# Patient Record
Sex: FEMALE | Race: BLACK OR AFRICAN AMERICAN | NOT HISPANIC OR LATINO | Employment: FULL TIME | URBAN - METROPOLITAN AREA
[De-identification: names, ages, dates, MRNs, and addresses within clinical notes are randomized per-mention and may not be internally consistent; named-entity substitution may affect disease eponyms.]

---

## 2017-01-16 ENCOUNTER — TELEPHONE (OUTPATIENT)
Dept: OBSTETRICS AND GYNECOLOGY | Facility: CLINIC | Age: 27
End: 2017-01-16

## 2017-01-16 NOTE — TELEPHONE ENCOUNTER
----- Message from Flaca Stone sent at 1/16/2017  9:22 AM CST -----  Contact: pt  States she want's to get her depo and she's on her cycle now and she be off her cycle today or tomorrow. She needs to know what to do. Please call pt at [phone]. Thank you

## 2017-01-16 NOTE — TELEPHONE ENCOUNTER
Advised the pt to discuss Depo Provera with Dr. Dutton at her appt on Wednesday. Advised pt that she does not need to be on her cycle to get the depo shot and to collect a urine after she checks in for her appt. Pt voiced understanding.

## 2017-01-18 ENCOUNTER — OFFICE VISIT (OUTPATIENT)
Dept: OBSTETRICS AND GYNECOLOGY | Facility: CLINIC | Age: 27
End: 2017-01-18
Payer: COMMERCIAL

## 2017-01-18 VITALS
HEIGHT: 66 IN | DIASTOLIC BLOOD PRESSURE: 60 MMHG | WEIGHT: 115.06 LBS | SYSTOLIC BLOOD PRESSURE: 100 MMHG | BODY MASS INDEX: 18.49 KG/M2

## 2017-01-18 DIAGNOSIS — R10.2 PELVIC PAIN IN FEMALE: ICD-10-CM

## 2017-01-18 DIAGNOSIS — Z86.19 HISTORY OF CHLAMYDIA: ICD-10-CM

## 2017-01-18 DIAGNOSIS — Z30.013 ENCOUNTER FOR INITIAL PRESCRIPTION OF INJECTABLE CONTRACEPTIVE: ICD-10-CM

## 2017-01-18 DIAGNOSIS — N83.202 LEFT OVARIAN CYST: Primary | ICD-10-CM

## 2017-01-18 PROCEDURE — 99999 PR PBB SHADOW E&M-EST. PATIENT-LVL III: CPT | Mod: PBBFAC,,, | Performed by: OBSTETRICS & GYNECOLOGY

## 2017-01-18 PROCEDURE — 87591 N.GONORRHOEAE DNA AMP PROB: CPT

## 2017-01-18 PROCEDURE — 96372 THER/PROPH/DIAG INJ SC/IM: CPT | Mod: S$GLB,,, | Performed by: OBSTETRICS & GYNECOLOGY

## 2017-01-18 PROCEDURE — 99213 OFFICE O/P EST LOW 20 MIN: CPT | Mod: 25,S$GLB,, | Performed by: OBSTETRICS & GYNECOLOGY

## 2017-01-18 PROCEDURE — 1159F MED LIST DOCD IN RCRD: CPT | Mod: S$GLB,,, | Performed by: OBSTETRICS & GYNECOLOGY

## 2017-01-18 RX ORDER — MEDROXYPROGESTERONE ACETATE 150 MG/ML
150 INJECTION, SUSPENSION INTRAMUSCULAR
Status: DISCONTINUED | OUTPATIENT
Start: 2017-01-18 | End: 2018-02-15

## 2017-01-18 RX ADMIN — MEDROXYPROGESTERONE ACETATE 150 MG: 150 INJECTION, SUSPENSION INTRAMUSCULAR at 09:01

## 2017-01-18 NOTE — PROGRESS NOTES
"  Subjective:       Patient ID: Flavia Patricio is a 26 y.o. female.    Chief Complaint:  Follow-up      History of Present Illness  HPI  Presents to follow-up pelvic ultrasound.  Patient went to the ED for acute onset pelvic pain back in November.  CT Abd/pelvis showed a 5.5 cm left ovarian cyst.  She then presented to [facility] later that month for a well-woman exam.  Pelvic ultrasound was ordered, and showed a 3.5 cm left ovarian complex cyst, likely hemorrhagic cyst.  She then had a repeat ultrasound 6 weeks later that shows a 3.5cm left ovarian cyst consistent with possible endometrioma.  The patient reports some mild LLQ pain that has improved since November, but is still present.  She does, however, report "just not feeling right".  Has had epigastric pain that comes and goes and radiates into the RLQ.  She has been having nausea and fatigue, but no vomiting.  No documented fever.  No constipation or diarrhea.  Of note, she was treated for Chlamydia cervicitis in November, and her partner also completed therapy.  They have been using condoms, but one of the condoms broke during intercourse after they both completed therapy.  She wants a ELA today.  She also wants to start depo provera for contraception.    GYN & OB History  Patient's last menstrual period was 2017.   Date of Last Pap: 11/15/2016    OB History    Para Term  AB SAB TAB Ectopic Multiple Living   2 1 1  1 1    1      # Outcome Date GA Lbr Sung/2nd Weight Sex Delivery Anes PTL Lv   2 Term 06/10/15 40w0d  3.005 kg (6 lb 10 oz) M Vag-Spont   Y   1 SAB 12 5w0d                 Review of Systems  Review of Systems   Constitutional: Positive for fatigue. Negative for activity change, fever and unexpected weight change.   Gastrointestinal: Positive for abdominal pain (epigastric radiating mainly to the RLQ) and nausea. Negative for bloating, constipation, diarrhea and vomiting.   Genitourinary: Positive for pelvic pain (see " HPI). Negative for dyspareunia, dysuria, frequency, genital sores, hematuria, menorrhagia, menstrual problem, urgency, vaginal bleeding, vaginal discharge, vaginal pain, dysmenorrhea, postcoital bleeding and vaginal odor.   Skin:  Negative for rash and hair changes.           Objective:    Physical Exam:   Constitutional: She is oriented to person, place, and time. She appears well-developed and well-nourished. No distress.             Abdominal: Soft. She exhibits no distension and no mass. There is no tenderness. There is no rebound and no guarding. Hernia confirmed negative in the right inguinal area and confirmed negative in the left inguinal area.     Genitourinary: Vagina normal. Pelvic exam was performed with patient supine. There is no rash, tenderness, lesion or injury on the right labia. There is no rash, tenderness, lesion or injury on the left labia. Uterus is not deviated, not enlarged, not fixed, not tender and not experiencing uterine prolapse. Right adnexum displays no mass, no tenderness and no fullness. Left adnexum displays tenderness (mild). Left adnexum displays no mass and no fullness. No erythema, tenderness, bleeding, rectocele, cystocele or unspecified prolapse of vaginal walls in the vagina. No foreign body in the vagina. No signs of injury around the vagina. No vaginal discharge found. Cervix exhibits no motion tenderness, no discharge and no friability.        Uterus Size: 6 cm       Neurological: She is alert and oriented to person, place, and time.     Psychiatric: She has a normal mood and affect.        UPT: negative  Assessment:        1. Left ovarian cyst    2. History of chlamydia    3. Pelvic pain in female    4. Encounter for initial prescription of injectable contraceptive                Plan:      Flavia Carter was seen today for follow-up.    Diagnoses and all orders for this visit:    Left ovarian cyst  -     US Pelvis Complete Non OB; Future    History of chlamydia  -      C. trachomatis/N. gonorrhoeae by AMP DNA Cervix    Pelvic pain in female    Encounter for initial prescription of injectable contraceptive  -     medroxyPROGESTERone (DEPO-PROVERA) injection 150 mg; Inject 1 mL (150 mg total) into the muscle every 3 (three) months.    Patient has no evidence of PID on exam today.  No cervical d/c, CMT, or uterine tenderness.  I do not think her diffuse abdominal pain is related to a gyn source, and that needs further evaluation by her PCP.  The left ovarian cystic lesion seen is fairly small on ultrasound, and pt's LLQ pain seems to be improving.  Will start depo provera for contraception, but this will also help prevent further cysts from forming.  Will continue to monitor area on the left ovary.  I do not think she meets criteria just yet for surgery.  RTC pending results of repeat ultrasound.

## 2017-01-20 LAB
C TRACH DNA SPEC QL NAA+PROBE: NEGATIVE
N GONORRHOEA DNA SPEC QL NAA+PROBE: NEGATIVE

## 2017-02-14 ENCOUNTER — TELEPHONE (OUTPATIENT)
Dept: RADIOLOGY | Facility: HOSPITAL | Age: 27
End: 2017-02-14

## 2017-03-22 ENCOUNTER — TELEPHONE (OUTPATIENT)
Dept: RADIOLOGY | Facility: HOSPITAL | Age: 27
End: 2017-03-22

## 2017-03-23 ENCOUNTER — HOSPITAL ENCOUNTER (OUTPATIENT)
Dept: RADIOLOGY | Facility: HOSPITAL | Age: 27
Discharge: HOME | End: 2017-03-23
Attending: OBSTETRICS & GYNECOLOGY
Payer: COMMERCIAL

## 2017-03-23 DIAGNOSIS — N83.202 LEFT OVARIAN CYST: ICD-10-CM

## 2017-03-23 PROCEDURE — 76856 US EXAM PELVIC COMPLETE: CPT | Mod: 26,,, | Performed by: RADIOLOGY

## 2017-03-23 PROCEDURE — 76856 US EXAM PELVIC COMPLETE: CPT | Mod: TC,PO

## 2017-03-23 PROCEDURE — 76830 TRANSVAGINAL US NON-OB: CPT | Mod: 26,,, | Performed by: RADIOLOGY

## 2017-03-28 ENCOUNTER — NURSE TRIAGE (OUTPATIENT)
Dept: ADMINISTRATIVE | Facility: CLINIC | Age: 27
End: 2017-03-28

## 2017-03-28 NOTE — TELEPHONE ENCOUNTER
"  Reason for Disposition   [1] SEVERE pain (e.g., excruciating) AND [2] present > 1 hour    Answer Assessment - Initial Assessment Questions  1. LOCATION: "Where does it hurt?"       Just above waist on left side  2. RADIATION: "Does the pain shoot anywhere else?" (e.g., chest, back)      Goes across to the right side  3. ONSET: "When did the pain begin?" (e.g., minutes, hours or days ago)       Ongoing x 2 days  4. SUDDEN: "Gradual or sudden onset?"        5. PATTERN "Does the pain come and go, or is it constant?"     - If constant: "Is it getting better, staying the same, or worsening?"       (Note: Constant means the pain never goes away completely; most serious pain is constant and it progresses)      - If intermittent: "How long does it last?" "Do you have pain now?"      (Note: Intermittent means the pain goes away completely between bouts)      Constant -motrin relieved pain a little, didn't go away for long, and back now  6. SEVERITY: "How bad is the pain?"  (e.g., Scale 1-10; mild, moderate, or severe)     - MILD (1-3): doesn't interfere with normal activities, abdomen soft and not tender to touch      - MODERATE (4-7): interferes with normal activities or awakens from sleep, tender to touch      - SEVERE (8-10): excruciating pain, doubled over, unable to do any normal activities        8  7. RECURRENT SYMPTOM: "Have you ever had this type of abdominal pain before?" If so, ask: "When was the last time?" and "What happened that time?"         8. AGGRAVATING FACTORS: "Does anything seem to cause this pain?" (e.g., foods, stress, alcohol)      unsure  9. CARDIAC SYMPTOMS: "Do you have any of the following symptoms: chest pain, difficulty breathing, sweating, nausea?"      None reported  10. OTHER SYMPTOMS: "Do you have any other symptoms?" (e.g., fever, vomiting, diarrhea)        None reported  11. PREGNANCY: "Is there any chance you are pregnant?" "When was your last menstrual period?"         On depo " injection x 3 months    Protocols used: ST ABDOMINAL PAIN - [facility]-

## 2017-03-29 ENCOUNTER — TELEPHONE (OUTPATIENT)
Dept: OBSTETRICS AND GYNECOLOGY | Facility: CLINIC | Age: 27
End: 2017-03-29

## 2017-03-29 NOTE — TELEPHONE ENCOUNTER
----- Message from Naomi Maurer sent at 3/29/2017  9:22 AM CDT -----  Contact: Patient   Patient is having lower stomach pains, nausea and watery mouth, Please call her at [phone].    Thanks  Td

## 2017-04-06 ENCOUNTER — OFFICE VISIT (OUTPATIENT)
Dept: OBSTETRICS AND GYNECOLOGY | Facility: CLINIC | Age: 27
End: 2017-04-06
Payer: COMMERCIAL

## 2017-04-06 VITALS
DIASTOLIC BLOOD PRESSURE: 78 MMHG | SYSTOLIC BLOOD PRESSURE: 120 MMHG | BODY MASS INDEX: 18.85 KG/M2 | WEIGHT: 117.31 LBS | HEIGHT: 66 IN

## 2017-04-06 DIAGNOSIS — N89.8 VAGINAL DISCHARGE: ICD-10-CM

## 2017-04-06 DIAGNOSIS — N83.202 LEFT OVARIAN CYST: Primary | ICD-10-CM

## 2017-04-06 DIAGNOSIS — R10.2 PELVIC PAIN IN FEMALE: ICD-10-CM

## 2017-04-06 PROCEDURE — 99213 OFFICE O/P EST LOW 20 MIN: CPT | Mod: 25,S$GLB,, | Performed by: OBSTETRICS & GYNECOLOGY

## 2017-04-06 PROCEDURE — 87210 SMEAR WET MOUNT SALINE/INK: CPT | Mod: QW,S$GLB,, | Performed by: OBSTETRICS & GYNECOLOGY

## 2017-04-06 PROCEDURE — 99999 PR PBB SHADOW E&M-EST. PATIENT-LVL III: CPT | Mod: PBBFAC,,, | Performed by: OBSTETRICS & GYNECOLOGY

## 2017-04-06 PROCEDURE — 1160F RVW MEDS BY RX/DR IN RCRD: CPT | Mod: S$GLB,,, | Performed by: OBSTETRICS & GYNECOLOGY

## 2017-04-06 RX ORDER — NORGESTIMATE AND ETHINYL ESTRADIOL 7DAYSX3 28
KIT ORAL
COMMUNITY
Start: 2016-08-13 | End: 2017-04-06

## 2017-04-06 NOTE — PROGRESS NOTES
Subjective:       Patient ID: Flavia Patricio is a 26 y.o. female.    Chief Complaint:  Abdominal Pain      History of Present Illness  HPI  Presents to follow-up pelvic pain and repeat ultrasound findings.  To recap, the patient has had worsening LLQ pelvic pain for several months.  Imaging back in November was significant for a complex cyst on the left ovary measuring about 3cm that was consistent with either a hemorrhagic cyst or endometrioma.  Patient wanted to try depo provera for contraception.  She has achieved amenorrhea on depo provera, but continues to have significant LLQ pelvic pain that radiates to the midline.  Pain is daily.  Repeat pelvic ultrasound 3/23/17 shows a stable appearing 2.9cm complex cyst on the left ovary consistent with an endometrioma.  Pt also complains of a clear/white vaginal discharge.  No itching or odor.  No new sex partner.  GC/CT at last visit was neg/neg.    GYN & OB History  No LMP recorded. Patient has had an injection.   Date of Last Pap: 11/15/2016    OB History    Para Term  AB SAB TAB Ectopic Multiple Living   2 1 1  1 1    1      # Outcome Date GA Lbr Sung/2nd Weight Sex Delivery Anes PTL Lv   2 Term 06/10/15 40w0d  3.005 kg (6 lb 10 oz) M Vag-Spont   Y   1 SAB 12 5w0d                 Review of Systems  Review of Systems   Constitutional: Negative for activity change, fatigue, fever and unexpected weight change.   Gastrointestinal: Negative for abdominal pain, bloating, constipation, diarrhea, nausea and vomiting.   Genitourinary: Positive for pelvic pain and vaginal discharge. Negative for dyspareunia, dysuria, frequency, genital sores, hematuria, menorrhagia, menstrual problem, urgency, vaginal bleeding, vaginal pain, dysmenorrhea, postcoital bleeding and vaginal odor.   Skin:  Negative for rash and hair changes.           Objective:    Physical Exam:   Constitutional: She is oriented to person, place, and time. She appears well-developed  and well-nourished. No distress.             Abdominal: Soft. She exhibits no distension and no mass. There is no tenderness. There is no rebound and no guarding. Hernia confirmed negative in the right inguinal area and confirmed negative in the left inguinal area.     Genitourinary: Vagina normal. Pelvic exam was performed with patient supine. There is no rash, tenderness, lesion or injury on the right labia. There is no rash, tenderness, lesion or injury on the left labia. Uterus is not deviated, not enlarged, not fixed, not tender and not experiencing uterine prolapse. Right adnexum displays no mass, no tenderness and no fullness. Left adnexum displays tenderness. Left adnexum displays no mass and no fullness. No erythema, tenderness, bleeding, rectocele, cystocele or unspecified prolapse of vaginal walls in the vagina. No foreign body in the vagina. No signs of injury around the vagina. No vaginal discharge found. Cervix exhibits no motion tenderness, no discharge and no friability.        Uterus Size: 6 cm       Neurological: She is alert and oriented to person, place, and time.     Psychiatric: She has a normal mood and affect.        Wet prep: normal vaginal squamous epithelial cells    Assessment:        1. Left ovarian cyst    2. Pelvic pain in female    3. Vaginal discharge                Plan:      Flavia Carter was seen today for abdominal pain.    Diagnoses and all orders for this visit:    Left ovarian cyst    Pelvic pain in female    Vaginal discharge    At this point, since patient still having pelvic pain on depo provera and since complex cyst is persistent and consistent with endometrioma, I recommend surgery with laparoscopic left ovarian cystectomy.  Patient agrees with this plan.    Reassured her that her discharge is physiologic.  No treatment necessary.  RTC for pre-op visit.

## 2017-04-06 NOTE — PATIENT INSTRUCTIONS
Understanding Ovarian Cystectomy    An ovarian cystectomy is a type of surgery. It removes a cyst from your ovaries. A cyst is a sac full of fluid. You have two ovaries, one on each side of your uterus. The ovaries make your eggs (ova). They also make the hormone estrogen.  How to say it  [id number]   Why ovarian cystectomy is done  This procedure is done to remove a cyst on an ovary. Its usually done only if the cyst is large or painful. Many women have cysts on their ovaries. They are often benign. But they can be cancerous.  How ovarian cystectomy is done  This procedure is often done in a hospital. You may need to spend a few days in the hospital after the cyst is removed. During the procedure:  · You are given medicine to make you fall asleep. You wont feel any pain.  · The surgeon makes a cut (incision) in your belly (abdomen) to reach your reproductive organs.  · The surgeon puts a clamp on the ovary to hold it still.  · The surgeon carefully cuts into the ovary, revealing the cyst.  · The surgeon may drain the cyst. It is then cut away from the ovary.  · The cyst may be sent to a lab to check for disease, such as cancer.  · The surgeon stops any bleeding from the ovary. He or she then closes the cut in your belly.  Risks of ovarian cystectomy  · Bleeding  · Damage to an ovary  · Infection  · Injury to the bladder  Date Last Reviewed: 6/1/2016  © 5610-9549 [employer]. [address] [place] [address], [place] [address]. All rights reserved. This information is not intended as a substitute for professional medical care. Always follow your healthcare professional's instructions.

## 2017-04-07 ENCOUNTER — TELEPHONE (OUTPATIENT)
Dept: OBSTETRICS AND GYNECOLOGY | Facility: CLINIC | Age: 27
End: 2017-04-07

## 2017-04-07 DIAGNOSIS — N83.202 CYST OF LEFT OVARY: Primary | ICD-10-CM

## 2017-04-19 ENCOUNTER — TELEPHONE (OUTPATIENT)
Dept: OBSTETRICS AND GYNECOLOGY | Facility: CLINIC | Age: 27
End: 2017-04-19

## 2017-04-19 ENCOUNTER — CLINICAL SUPPORT (OUTPATIENT)
Dept: OBSTETRICS AND GYNECOLOGY | Facility: CLINIC | Age: 27
End: 2017-04-19
Payer: COMMERCIAL

## 2017-04-19 PROCEDURE — 96372 THER/PROPH/DIAG INJ SC/IM: CPT | Mod: S$GLB,,, | Performed by: OBSTETRICS & GYNECOLOGY

## 2017-04-19 RX ADMIN — MEDROXYPROGESTERONE ACETATE 150 MG: 150 INJECTION, SUSPENSION INTRAMUSCULAR at 01:04

## 2017-04-19 NOTE — PROGRESS NOTES
Two pt identifiers verified  pt notified to wait in clinic for 15 minutes after receiving injection, pt verbalized understanding  150mg/mL of Depo Provera administered IM to pt's right ventrogluteal.   Pt tolerated well  Next injection scheduled for 07/12/2017 at 1000

## 2017-04-24 ENCOUNTER — OFFICE VISIT (OUTPATIENT)
Dept: OBSTETRICS AND GYNECOLOGY | Facility: CLINIC | Age: 27
End: 2017-04-24
Payer: COMMERCIAL

## 2017-04-24 ENCOUNTER — HOSPITAL ENCOUNTER (OUTPATIENT)
Dept: PREADMISSION TESTING | Facility: HOSPITAL | Age: 27
Discharge: HOME | End: 2017-04-24
Attending: OBSTETRICS & GYNECOLOGY
Payer: COMMERCIAL

## 2017-04-24 VITALS
BODY MASS INDEX: 18.7 KG/M2 | HEIGHT: 66 IN | SYSTOLIC BLOOD PRESSURE: 98 MMHG | WEIGHT: 116.38 LBS | DIASTOLIC BLOOD PRESSURE: 66 MMHG

## 2017-04-24 VITALS — HEIGHT: 66 IN | BODY MASS INDEX: 18.32 KG/M2 | WEIGHT: 114 LBS

## 2017-04-24 DIAGNOSIS — R10.2 PELVIC PAIN IN FEMALE: ICD-10-CM

## 2017-04-24 DIAGNOSIS — N83.202 LEFT OVARIAN CYST: Primary | ICD-10-CM

## 2017-04-24 DIAGNOSIS — D50.8 IRON DEFICIENCY ANEMIA SECONDARY TO INADEQUATE DIETARY IRON INTAKE: ICD-10-CM

## 2017-04-24 PROCEDURE — 99499 UNLISTED E&M SERVICE: CPT | Mod: S$GLB,,, | Performed by: OBSTETRICS & GYNECOLOGY

## 2017-04-24 PROCEDURE — 99999 PR PBB SHADOW E&M-EST. PATIENT-LVL II: CPT | Mod: PBBFAC,,, | Performed by: OBSTETRICS & GYNECOLOGY

## 2017-04-24 RX ORDER — SODIUM CHLORIDE, SODIUM LACTATE, POTASSIUM CHLORIDE, CALCIUM CHLORIDE 600; 310; 30; 20 MG/100ML; MG/100ML; MG/100ML; MG/100ML
INJECTION, SOLUTION INTRAVENOUS CONTINUOUS
Status: CANCELLED | OUTPATIENT
Start: 2017-04-24

## 2017-04-24 RX ORDER — ACETAMINOPHEN 10 MG/ML
1000 INJECTION, SOLUTION INTRAVENOUS
Status: CANCELLED | OUTPATIENT
Start: 2017-04-24 | End: 2017-04-24

## 2017-04-24 NOTE — IP AVS SNAPSHOT
[facility]  [id number] [place] [address] [address] [place] [address]           Patient Discharge Instructions  Our goal is to set you up for success. This packet includes information on your condition, medications, and your home care. It will help you care for yourself to prevent having to return to the hospital.     Please ask your nurse if you have any questions.      There are many details to remember when preparing for your surgery. Here is what you will need to do, please ask your nurse if there are more specific instructions and if you have any questions:    1. Before procedure Do not smoke or drink alcoholic beverages 24 hours prior to your procedure. Do not eat or drink anything 8 hours before your procedure - this includes gum, mints, and candy.     2. Day of procedure Please remove all jewelry for the procedure. If you wear contact lenses, dentures, hearing aids or glasses, bring a container to put them in during your surgery and give to a family member.  If your doctor has scheduled you for an overnight stay, bring a small overnight bag with any personal items that you need.      3. After procedure  Make arrangements in advance for transportation home by a responsible adult. It is not safe to drive a vehicle during the 24 hours following surgery.     PLEASE NOTE: You may be contacted the day before your surgery to confirm your surgery date and arrival time. The Surgery schedule has many variables which may affect the time of your surgery case. Family members should be available if your surgery time changes.           [facility]  Unless otherwise directed by your provider, please   contact [facility], our nurse care line   that is available for 24/7 assistance.     [phone] (toll-free)     Registered nurses in the [facility]   provide: appointment scheduling, clinical advisement, health education, and other advisory services.                  ** Verify the list of  medication(s) below is accurate and up to date. Carry this with you in case of emergency. If your medications have changed, please notify your healthcare provider.             Medication List      TAKE these medications        Additional Info                      ferrous gluconate 325 MG Tab   Commonly known as:  FERGON   Quantity:  30 tablet   Refills:  3   Dose:  325 mg    Instructions:  Take 1 tablet (325 mg total) by mouth daily with breakfast.     Begin Date    AM    Noon    PM    Bedtime                  Please bring to all follow up appointments:    1. A copy of your discharge instructions.  2. All medicines you are currently taking in their original bottles.  3. Identification and insurance card.    Please arrive 15 minutes ahead of scheduled appointment time.    Please call 24 hours in advance if you must reschedule your appointment and/or time.        Your Scheduled Appointments     Apr 24, 2017 10:30 AM CDT   Non-Fasting Lab with LABORATORY, CONI AHMADI   [facility] ([facility] [address]   [phone]            May 22, 2017  8:30 AM CDT   Post OP with MD Coni Vasquez - OB/ GYN ([facility]) [address] [place] [address]   [phone]            Jul 12, 2017 10:00 AM CDT   Nurse Visit with OB GYN NURSEELO - OB/ GYN ([facility]) [address] [place] [address]   [phone]              Your Future Surgeries/Procedures     May 01, 2017   Surgery with Yohana Dutton MD   [facility] [place] [facility])    [address] [place] [address]   [phone]                  Discharge Instructions       To confirm, Your doctor has instructed you that surgery is scheduled for 5/1/17 at  09:30am.       [facility], main lobby by 08:00am Pre admit nurse will  "call day prior to surgery for final arrival time.      INSTRUCTIONS IMPORTANT!!!   Do not eat, drink, or smoke after 12 midnight. May have water or clear liquid juice until 3 hrs prior to surgery. OK to brush teeth, no gum, candy or mints!    ¨ Take only these medicines with a small swallow of water-morning of surgery.  None    Pre operative instructions:  Please review the Pre-Operative Instruction booklet that you were given.        Bathing Instructions--See page 6 in the Pre-operative booklet.      Prevention of surgical site infections:     -Keep incisions clean and dry.   -Do not soak/submerge incisions in water until completely healed.   -Do not apply lotions, powders, creams, or deodorants to site.   -Always make sure hands are cleaned with antibacterial soap/ alcohol-based                 prior to touching the surgical site.  (This includes doctors,                 nurses, staff, and yourself.)    Signs and symptoms:   -Redness and pain around the area where you had surgery   -Drainage of cloudy fluid from your surgical wound   -Fever over 100.4       I have read or had read and explained to me, and understand the above information.  Additional comments or instructions:  Received a copy of Pre-operative instructions booklet, FAQ surgical site infection sheet, and packets of hibiclens (if indicated).      Discharge References/Attachments     CYSTECTOMY, OVARIAN, UNDERSTANDING (ENGLISH)    ANESTHESIA: GENERAL ANESTHESIA (ENGLISH)    POSTSURGICAL DEEP BREATHING, DISCHARGE INSTRUCTIONS (ENGLISH)        Admission Information     Date & Time Provider Department CSN    4/24/2017  9:00 AM Yohana Dutton MD [facility] -  [id number]      Care Providers     Provider Role Specialty Primary office phone    Yohana Dutton MD Attending Provider Obstetrics and Gynecology [phone]      Your Vitals Were     Height Weight BMI          5' 6" (1.676 m) 51.7 kg (114 lb) 18.4 kg/m2        Recent Lab " Values     No lab values to display.      Allergies as of 4/24/2017     No Known Allergies      Advance Directives     An advance directive is a document which, in the event you are no longer able to make decisions for yourself, tells your healthcare team what kind of treatment you do or do not want to receive, or who you would like to make those decisions for you.  If you do not currently have an advance directive, [facility] encourages you to create one.  For more information call:  [phone]), [phone] ([phone]),  or log on to [email or web address].        Smoking Cessation     If you would like to quit smoking:   You may be eligible for free services if you are a [place] resident and started smoking cigarettes before September 1, 1988.  Call the Smoking Cessation Trust (SCT) toll free at (498) 705-5074 or (512) 783-9263.   Call 4-487-QUIT-NOW if you do not meet the above criteria.   Contact us via email: [email or web address]   View our website for more information: [email or web address]        Language Assistance Services     ATTENTION: Language assistance services are available, free of charge. Please call [phone].      ATENCIÓN: Si habla español, tiene a larry disposición servicios gratuitos de asistencia lingüística. Llame al [phone].     [facility] Ý: N?u b?n nói Ti?ng Vi?t, có các d?ch v? h? tr? ngôn ng? mi?n phí dành cho b?n. G?i s? [phone].         [facility] complies with applicable Federal civil rights laws and does not discriminate on the basis of race, color, national origin, age, disability, or sex.

## 2017-04-24 NOTE — PROGRESS NOTES
"History & Physical    SUBJECTIVE:     History of Present Illness:  Patient is a 26 y.o. female presents for pre-op visit for laparoscopic left ovarian cystectomy.  the patient has had worsening LLQ pelvic pain for several months. Imaging back in November was significant for a complex cyst on the left ovary measuring about 3cm that was consistent with either a hemorrhagic cyst or endometrioma. Patient wanted to try depo provera for contraception. She has achieved amenorrhea on depo provera, but continues to have significant LLQ pelvic pain that radiates to the midline. Pain is daily. Repeat pelvic ultrasound 3/23/17 shows a stable appearing 2.9cm complex cyst on the left ovary consistent with an endometrioma.      Requests refill of iron today.    Past Medical History:   Diagnosis Date    Anemia     Genital herpes complicating pregnancy in second trimester 2/16/2015    GERD (gastroesophageal reflux disease)     Insomnia     Miscarriage     Pelvic inflammatory disease (PID)     Restless leg syndrome      Past Surgical History:   Procedure Laterality Date    COLONOSCOPY W/ POLYPECTOMY      x 1    WISDOM TOOTH EXTRACTION       Social History     Social History    Marital status: Single     Spouse name: N/A    Number of children: N/A    Years of education: N/A     Occupational History    manager       Social History Main Topics    Smoking status: Light Tobacco Smoker    Smokeless tobacco: Not on file      Comment: pt states "i smoke marijuana twice a day"    Alcohol use Yes      Comment: rare    Drug use: 14.00 per week     Special: Marijuana    Sexual activity: Yes     Partners: Male     Birth control/ protection: None     Other Topics Concern    Not on file     Social History Narrative     Family History   Problem Relation Age of Onset    Colon cancer Maternal Grandfather     Breast cancer Cousin     Heart attack Maternal Uncle     Heart attack Maternal Uncle     Heart attack Maternal Uncle     " Heart attack Maternal Uncle     Heart attack Maternal Uncle      Review of patient's allergies indicates:  No Known Allergies  Current Outpatient Prescriptions   Medication Sig Dispense Refill    ferrous gluconate (FERGON) 325 MG Tab Take 1 tablet (325 mg total) by mouth daily with breakfast. 30 tablet 3     Current Facility-Administered Medications   Medication Dose Route Frequency Provider Last Rate Last Dose    medroxyPROGESTERone (DEPO-PROVERA) injection 150 mg  150 mg Intramuscular Q90 Days Yohana Dutton MD   150 mg at 04/19/17 1332            Review of Systems:  Constitutional: no fever or chills  Respiratory: no cough or shortness of breath  Cardiovascular: no chest pain or palpitations  Gastrointestinal: no nausea or vomiting, tolerating diet  Genitourinary: see HPI  Hematologic/Lymphatic: no easy bruising or lymphadenopathy  Neurological: no seizures or tremors      OBJECTIVE:     Vitals:    04/24/17 0813   BP: 98/66         Physical Exam:  General: well developed, well nourished, no distress  Head: normocephalic  Neck: supple, symmetrical, trachea midline  Lungs:  clear to auscultation bilaterally and normal respiratory effort  Chest Wall: no tenderness  Heart: regular rate and rhythm, S1, S2 normal, no murmur, rub or gallop  Abdomen: soft, non-tender non-distented; bowel sounds normal; no masses,  no organomegaly  Extremities: no cyanosis or edema, or clubbing  Pulses: 2+ and symmetric      Laboratory  Pre-op labs pending      ASSESSMENT/PLAN:     Flavia Carter was seen today for pre-op exam.    Diagnoses and all orders for this visit:    Left ovarian cyst    Pelvic pain in female    Iron deficiency anemia secondary to inadequate dietary iron intake  -     ferrous gluconate (FERGON) 325 MG Tab; Take 1 tablet (325 mg total) by mouth daily with breakfast.     The risks, benefits, and alternatives of the procedure were reviewed with the patient.  Surgical consents were reviewed in detail and were  signed.  All questions were answered. Proceed with laparoscopic left ovarian cystectomy as scheduled.

## 2017-04-24 NOTE — DISCHARGE INSTRUCTIONS
To confirm, Your doctor has instructed you that surgery is scheduled for 5/1/17 at  09:30am.       Please report to [facility], [address] [facility], 1st floor, main lobby by 08:00am Pre admit nurse will call day prior to surgery for final arrival time.      INSTRUCTIONS IMPORTANT!!!   Do not eat, drink, or smoke after 12 midnight. May have water or clear liquid juice until 3 hrs prior to surgery. OK to brush teeth, no gum, candy or mints!    ¨ Take only these medicines with a small swallow of water-morning of surgery.  None    Pre operative instructions:  Please review the Pre-Operative Instruction booklet that you were given.        Bathing Instructions--See page 6 in the Pre-operative booklet.      Prevention of surgical site infections:     -Keep incisions clean and dry.   -Do not soak/submerge incisions in water until completely healed.   -Do not apply lotions, powders, creams, or deodorants to site.   -Always make sure hands are cleaned with antibacterial soap/ alcohol-based                 prior to touching the surgical site.  (This includes doctors,                 nurses, staff, and yourself.)    Signs and symptoms:   -Redness and pain around the area where you had surgery   -Drainage of cloudy fluid from your surgical wound   -Fever over 100.4       I have read or had read and explained to me, and understand the above information.  Additional comments or instructions:  Received a copy of Pre-operative instructions booklet, FAQ surgical site infection sheet, and packets of hibiclens (if indicated).

## 2017-04-30 ENCOUNTER — ANESTHESIA EVENT (OUTPATIENT)
Dept: SURGERY | Facility: HOSPITAL | Age: 27
End: 2017-04-30
Payer: COMMERCIAL

## 2017-05-01 ENCOUNTER — HOSPITAL ENCOUNTER (OUTPATIENT)
Facility: HOSPITAL | Age: 27
Discharge: HOME | End: 2017-05-01
Attending: OBSTETRICS & GYNECOLOGY | Admitting: OBSTETRICS & GYNECOLOGY
Payer: COMMERCIAL

## 2017-05-01 ENCOUNTER — ANESTHESIA (OUTPATIENT)
Dept: SURGERY | Facility: HOSPITAL | Age: 27
End: 2017-05-01
Payer: COMMERCIAL

## 2017-05-01 VITALS
DIASTOLIC BLOOD PRESSURE: 93 MMHG | TEMPERATURE: 98 F | HEART RATE: 88 BPM | SYSTOLIC BLOOD PRESSURE: 148 MMHG | RESPIRATION RATE: 12 BRPM | OXYGEN SATURATION: 100 % | BODY MASS INDEX: 18.21 KG/M2 | HEIGHT: 66 IN | WEIGHT: 113.31 LBS

## 2017-05-01 DIAGNOSIS — N80.30 ENDOMETRIOSIS OF PELVIC PERITONEUM: ICD-10-CM

## 2017-05-01 DIAGNOSIS — N83.202 LEFT OVARIAN CYST: Primary | ICD-10-CM

## 2017-05-01 DIAGNOSIS — R10.2 PELVIC PAIN IN FEMALE: ICD-10-CM

## 2017-05-01 LAB
B-HCG UR QL: NEGATIVE
CTP QC/QA: YES

## 2017-05-01 PROCEDURE — 37000009 HC ANESTHESIA EA ADD 15 MINS: Performed by: OBSTETRICS & GYNECOLOGY

## 2017-05-01 PROCEDURE — 37000008 HC ANESTHESIA 1ST 15 MINUTES: Performed by: OBSTETRICS & GYNECOLOGY

## 2017-05-01 PROCEDURE — 25000003 PHARM REV CODE 250: Performed by: ANESTHESIOLOGY

## 2017-05-01 PROCEDURE — 63600175 PHARM REV CODE 636 W HCPCS: Performed by: ANESTHESIOLOGY

## 2017-05-01 PROCEDURE — 63600175 PHARM REV CODE 636 W HCPCS: Performed by: NURSE ANESTHETIST, CERTIFIED REGISTERED

## 2017-05-01 PROCEDURE — 36000709 HC OR TIME LEV III EA ADD 15 MIN: Performed by: OBSTETRICS & GYNECOLOGY

## 2017-05-01 PROCEDURE — 88305 TISSUE EXAM BY PATHOLOGIST: CPT | Mod: 26,,, | Performed by: PATHOLOGY

## 2017-05-01 PROCEDURE — 36000708 HC OR TIME LEV III 1ST 15 MIN: Performed by: OBSTETRICS & GYNECOLOGY

## 2017-05-01 PROCEDURE — 25000003 PHARM REV CODE 250: Performed by: NURSE ANESTHETIST, CERTIFIED REGISTERED

## 2017-05-01 PROCEDURE — 81025 URINE PREGNANCY TEST: CPT | Performed by: OBSTETRICS & GYNECOLOGY

## 2017-05-01 PROCEDURE — 58662 LAPAROSCOPY EXCISE LESIONS: CPT | Mod: ,,, | Performed by: OBSTETRICS & GYNECOLOGY

## 2017-05-01 PROCEDURE — 71000039 HC RECOVERY, EACH ADD'L HOUR: Performed by: OBSTETRICS & GYNECOLOGY

## 2017-05-01 PROCEDURE — 71000033 HC RECOVERY, INTIAL HOUR: Performed by: OBSTETRICS & GYNECOLOGY

## 2017-05-01 PROCEDURE — 63600175 PHARM REV CODE 636 W HCPCS: Performed by: OBSTETRICS & GYNECOLOGY

## 2017-05-01 PROCEDURE — 88305 TISSUE EXAM BY PATHOLOGIST: CPT | Performed by: PATHOLOGY

## 2017-05-01 PROCEDURE — 27201423 OPTIME MED/SURG SUP & DEVICES STERILE SUPPLY: Performed by: OBSTETRICS & GYNECOLOGY

## 2017-05-01 PROCEDURE — 71000015 HC POSTOP RECOV 1ST HR: Performed by: OBSTETRICS & GYNECOLOGY

## 2017-05-01 RX ORDER — KETOROLAC TROMETHAMINE 30 MG/ML
30 INJECTION, SOLUTION INTRAMUSCULAR; INTRAVENOUS ONCE
Status: COMPLETED | OUTPATIENT
Start: 2017-05-01 | End: 2017-05-01

## 2017-05-01 RX ORDER — SODIUM CHLORIDE 9 MG/ML
3 INJECTION, SOLUTION INTRAMUSCULAR; INTRAVENOUS; SUBCUTANEOUS
Status: DISCONTINUED | OUTPATIENT
Start: 2017-05-01 | End: 2017-05-01 | Stop reason: HOSPADM

## 2017-05-01 RX ORDER — ROCURONIUM BROMIDE 10 MG/ML
INJECTION, SOLUTION INTRAVENOUS
Status: DISCONTINUED | OUTPATIENT
Start: 2017-05-01 | End: 2017-05-01

## 2017-05-01 RX ORDER — SODIUM CHLORIDE, SODIUM LACTATE, POTASSIUM CHLORIDE, CALCIUM CHLORIDE 600; 310; 30; 20 MG/100ML; MG/100ML; MG/100ML; MG/100ML
INJECTION, SOLUTION INTRAVENOUS CONTINUOUS
Status: DISCONTINUED | OUTPATIENT
Start: 2017-05-01 | End: 2017-05-01 | Stop reason: HOSPADM

## 2017-05-01 RX ORDER — SODIUM CHLORIDE 9 MG/ML
3 INJECTION, SOLUTION INTRAMUSCULAR; INTRAVENOUS; SUBCUTANEOUS EVERY 8 HOURS
Status: DISCONTINUED | OUTPATIENT
Start: 2017-05-01 | End: 2017-05-01 | Stop reason: HOSPADM

## 2017-05-01 RX ORDER — OXYCODONE AND ACETAMINOPHEN 5; 325 MG/1; MG/1
1 TABLET ORAL EVERY 4 HOURS PRN
Qty: 30 TABLET | Refills: 0 | Status: SHIPPED | OUTPATIENT
Start: 2017-05-01 | End: 2018-02-15

## 2017-05-01 RX ORDER — PROPOFOL 10 MG/ML
VIAL (ML) INTRAVENOUS
Status: DISCONTINUED | OUTPATIENT
Start: 2017-05-01 | End: 2017-05-01

## 2017-05-01 RX ORDER — LIDOCAINE HYDROCHLORIDE 10 MG/ML
INJECTION INFILTRATION; PERINEURAL
Status: DISCONTINUED | OUTPATIENT
Start: 2017-05-01 | End: 2017-05-01

## 2017-05-01 RX ORDER — FENTANYL CITRATE 50 UG/ML
INJECTION, SOLUTION INTRAMUSCULAR; INTRAVENOUS
Status: DISCONTINUED | OUTPATIENT
Start: 2017-05-01 | End: 2017-05-01

## 2017-05-01 RX ORDER — OXYCODONE HYDROCHLORIDE 5 MG/1
5 TABLET ORAL ONCE
Status: COMPLETED | OUTPATIENT
Start: 2017-05-01 | End: 2017-05-01

## 2017-05-01 RX ORDER — MEPERIDINE HYDROCHLORIDE 50 MG/ML
12.5 INJECTION INTRAMUSCULAR; INTRAVENOUS; SUBCUTANEOUS ONCE AS NEEDED
Status: COMPLETED | OUTPATIENT
Start: 2017-05-01 | End: 2017-05-01

## 2017-05-01 RX ORDER — LIDOCAINE HYDROCHLORIDE 10 MG/ML
1 INJECTION, SOLUTION EPIDURAL; INFILTRATION; INTRACAUDAL; PERINEURAL ONCE
Status: DISCONTINUED | OUTPATIENT
Start: 2017-05-01 | End: 2017-05-01 | Stop reason: HOSPADM

## 2017-05-01 RX ORDER — MIDAZOLAM HYDROCHLORIDE 1 MG/ML
INJECTION, SOLUTION INTRAMUSCULAR; INTRAVENOUS
Status: DISCONTINUED | OUTPATIENT
Start: 2017-05-01 | End: 2017-05-01

## 2017-05-01 RX ORDER — MORPHINE SULFATE 10 MG/ML
2 INJECTION INTRAMUSCULAR; INTRAVENOUS; SUBCUTANEOUS EVERY 5 MIN PRN
Status: DISCONTINUED | OUTPATIENT
Start: 2017-05-01 | End: 2017-05-01 | Stop reason: HOSPADM

## 2017-05-01 RX ORDER — PROMETHAZINE HYDROCHLORIDE 12.5 MG/1
12.5 TABLET ORAL EVERY 6 HOURS PRN
Qty: 30 TABLET | Refills: 0 | Status: SHIPPED | OUTPATIENT
Start: 2017-05-01 | End: 2018-02-15

## 2017-05-01 RX ORDER — ACETAMINOPHEN 10 MG/ML
1000 INJECTION, SOLUTION INTRAVENOUS
Status: COMPLETED | OUTPATIENT
Start: 2017-05-01 | End: 2017-05-01

## 2017-05-01 RX ORDER — ONDANSETRON 2 MG/ML
INJECTION INTRAMUSCULAR; INTRAVENOUS
Status: DISCONTINUED | OUTPATIENT
Start: 2017-05-01 | End: 2017-05-01

## 2017-05-01 RX ADMIN — FENTANYL CITRATE 100 MCG: 50 INJECTION, SOLUTION INTRAMUSCULAR; INTRAVENOUS at 09:05

## 2017-05-01 RX ADMIN — ACETAMINOPHEN 1000 MG: 10 INJECTION, SOLUTION INTRAVENOUS at 10:05

## 2017-05-01 RX ADMIN — PROPOFOL 50 MG: 10 INJECTION, EMULSION INTRAVENOUS at 10:05

## 2017-05-01 RX ADMIN — SODIUM CHLORIDE, SODIUM LACTATE, POTASSIUM CHLORIDE, AND CALCIUM CHLORIDE: 600; 310; 30; 20 INJECTION, SOLUTION INTRAVENOUS at 09:05

## 2017-05-01 RX ADMIN — PROPOFOL 150 MG: 10 INJECTION, EMULSION INTRAVENOUS at 10:05

## 2017-05-01 RX ADMIN — KETOROLAC TROMETHAMINE 30 MG: 30 INJECTION, SOLUTION INTRAMUSCULAR at 12:05

## 2017-05-01 RX ADMIN — MORPHINE SULFATE 2 MG: 10 INJECTION, SOLUTION INTRAMUSCULAR; INTRAVENOUS at 11:05

## 2017-05-01 RX ADMIN — MORPHINE SULFATE 2 MG: 10 INJECTION, SOLUTION INTRAMUSCULAR; INTRAVENOUS at 12:05

## 2017-05-01 RX ADMIN — ROCURONIUM BROMIDE 25 MG: 10 INJECTION, SOLUTION INTRAVENOUS at 10:05

## 2017-05-01 RX ADMIN — MIDAZOLAM HYDROCHLORIDE 2 MG: 1 INJECTION, SOLUTION INTRAMUSCULAR; INTRAVENOUS at 09:05

## 2017-05-01 RX ADMIN — LIDOCAINE HYDROCHLORIDE 80 MG: 10 INJECTION, SOLUTION INFILTRATION; PERINEURAL at 10:05

## 2017-05-01 RX ADMIN — MEPERIDINE HYDROCHLORIDE 12.5 MG: 50 INJECTION INTRAMUSCULAR; INTRAVENOUS; SUBCUTANEOUS at 11:05

## 2017-05-01 RX ADMIN — FENTANYL CITRATE 100 MCG: 50 INJECTION, SOLUTION INTRAMUSCULAR; INTRAVENOUS at 10:05

## 2017-05-01 RX ADMIN — ONDANSETRON 4 MG: 2 INJECTION, SOLUTION INTRAMUSCULAR; INTRAVENOUS at 10:05

## 2017-05-01 RX ADMIN — FENTANYL CITRATE 50 MCG: 50 INJECTION, SOLUTION INTRAMUSCULAR; INTRAVENOUS at 10:05

## 2017-05-01 RX ADMIN — OXYCODONE HYDROCHLORIDE 5 MG: 5 TABLET ORAL at 11:05

## 2017-05-01 RX ADMIN — SODIUM CHLORIDE, SODIUM LACTATE, POTASSIUM CHLORIDE, AND CALCIUM CHLORIDE: 600; 310; 30; 20 INJECTION, SOLUTION INTRAVENOUS at 10:05

## 2017-05-01 NOTE — ANESTHESIA PREPROCEDURE EVALUATION
05/01/2017  Flavia Patricio is a 26 y.o., female.    Anesthesia Evaluation    I have reviewed the Patient Summary Reports.    I have reviewed the Nursing Notes.   I have reviewed the Medications.     Review of Systems  Anesthesia Hx:  No problems with previous Anesthesia  Neg history of prior surgery. Denies Family Hx of Anesthesia complications.    Social:  Smoker    Hepatic/GI:   GERD        Physical Exam  General:  Well nourished    Airway/Jaw/Neck:  Airway Findings: Mallampati: I     Dental:  DENTAL FINDINGS: Normal   Chest/Lungs:  Chest/Lungs Findings: Normal Respiratory Rate     Heart/Vascular:  Heart Findings: Normal            Anesthesia Plan  Type of Anesthesia, risks & benefits discussed:  Anesthesia Type:  general  Patient's Preference:   Intra-op Monitoring Plan:   Intra-op Monitoring Plan Comments:   Post Op Pain Control Plan:   Post Op Pain Control Plan Comments:   Induction:    Beta Blocker:  Patient is not currently on a Beta-Blocker (No further documentation required).       Informed Consent: Patient understands risks and agrees with Anesthesia plan.  Questions answered. Anesthesia consent signed with patient.  ASA Score: 2     Day of Surgery Review of History & Physical: I have interviewed and examined the patient. I have reviewed the patient's H&P dated:  There are no significant changes.          Ready For Surgery From Anesthesia Perspective.

## 2017-05-01 NOTE — INTERVAL H&P NOTE
The patient has been examined and the H&P has been reviewed:    I concur with the findings and no changes have occurred since H&P was written.    Anesthesia/Surgery risks, benefits and alternative options discussed and understood by patient/family.          Active Hospital Problems    Diagnosis  POA    Left ovarian cyst [N83.202]  Yes      Resolved Hospital Problems    Diagnosis Date Resolved POA   No resolved problems to display.

## 2017-05-01 NOTE — DISCHARGE INSTRUCTIONS
General Information:    1.  Do not drink alcoholic beverages including beer for 24 hours or as long as you are on pain medication..  2.  Do not drive a motor vehicle, operate machinery or power tools, or signs legal papers for 24 hours or as long as you are on pain medication.   3.  You may experience light-headedness, dizziness, and sleepiness following surgery. Please do not stay alone. A responsible adult should be with you for this 24 hour period.  4.  Go home and rest.    5. Progress slowly to a normal diet unless instructed.  Otherwise, begin with liquids such as soft drinks, then soup and crackers working up to solid foods. Drink plenty of nonalcoholic fluids.  6.  Certain anesthetics and pain medications produce nausea and vomiting in certain       individuals. If nausea becomes a problem at home, call you doctor.    7. A nurse will be calling you sometime after surgery. Do not be alarmed. This is our way of finding out how you are doing.    8. Several times every hour while you are awake, take 2-3 deep breaths and cough. If you had stomach surgery hold a pillow or rolled towel firmly against your stomach before you cough. This will help with any pain the cough might cause.  9. Several times every hour while you are awake, pump and flex your feet 5-6 times and do foot circles. This will help prevent blood clots.    10.Call your doctor for severe pain, bleeding, fever, or signs or symptoms of infection (pain, swelling, redness, foul odor, drainage).    11.You can contact your doctor anytime by [phone] for the [facility] (at [facility]) or [phone] for the [facility] on [facility].   [email or web address] is another way to contact your doctor if you are an active participant online with [facility].        Understanding Ovarian Cystectomy    An ovarian cystectomy is a type of surgery. It removes a cyst from your ovaries. A cyst is a sac full of fluid. You have two ovaries, one on  each side of your uterus. The ovaries make your eggs (ova). They also make the hormone estrogen.  How to say it  [id number]   Why ovarian cystectomy is done  This procedure is done to remove a cyst on an ovary. Its usually done only if the cyst is large or painful. Many women have cysts on their ovaries. They are often benign. But they can be cancerous.  How ovarian cystectomy is done  This procedure is often done in a hospital. You may need to spend a few days in the hospital after the cyst is removed. During the procedure:  · You are given medicine to make you fall asleep. You wont feel any pain.  · The surgeon makes a cut (incision) in your belly (abdomen) to reach your reproductive organs.  · The surgeon puts a clamp on the ovary to hold it still.  · The surgeon carefully cuts into the ovary, revealing the cyst.  · The surgeon may drain the cyst. It is then cut away from the ovary.  · The cyst may be sent to a lab to check for disease, such as cancer.  · The surgeon stops any bleeding from the ovary. He or she then closes the cut in your belly.  Risks of ovarian cystectomy  · Bleeding  · Damage to an ovary  · Infection  · Injury to the bladder  Date Last Reviewed: 6/1/2016  © 1713-0157 [employer]. [address] [place] [address], [place] [address]. All rights reserved. This information is not intended as a substitute for professional medical care. Always follow your healthcare professional's instructions.        Acetaminophen; Oxycodone tablets  What is this medicine?  ACETAMINOPHEN; OXYCODONE (a set a SAV luz elena fen; ox i KOE done) is a pain reliever. It is used to treat moderate to severe pain.  How should I use this medicine?  Take this medicine by mouth with a full glass of water. Follow the directions on the prescription label. You can take it with or without food. If it upsets your stomach, take it with food. Take your medicine at regular intervals. Do not take it more often than directed.  A  special MedGuide will be given to you by the pharmacist with each prescription and refill. Be sure to read this information carefully each time.  Talk to your pediatrician regarding the use of this medicine in children. Special care may be needed.  What side effects may I notice from receiving this medicine?  Side effects that you should report to your doctor or health care professional as soon as possible:  · allergic reactions like skin rash, itching or hives, swelling of the face, lips, or tongue  · breathing problems  · confusion  · redness, blistering, peeling or loosening of the skin, including inside the mouth  · signs and symptoms of liver injury like dark yellow or brown urine; general ill feeling or flu-like symptoms; light-colored stools; loss of appetite; nausea; right upper belly pain; unusually weak or tired; yellowing of the eyes or skin  · signs and symptoms of low blood pressure like dizziness; feeling faint or lightheaded, falls; unusually weak or tired  · trouble passing urine or change in the amount of urine  Side effects that usually do not require medical attention (report to your doctor or health care professional if they continue or are bothersome):  · constipation  · dry mouth  · nausea, vomiting  · tiredness  What may interact with this medicine?  This medicine may interact with the following medications:  · alcohol  · antihistamines for allergy, cough and cold  · antiviral medicines for HIV or AIDS  · atropine  · certain antibiotics like clarithromycin, erythromycin, linezolid, rifampin  · certain medicines for anxiety or sleep  · certain medicines for bladder problems like oxybutynin, tolterodine  · certain medicines for depression like amitriptyline, fluoxetine, sertraline  · certain medicines for fungal infections like ketoconazole, itraconazole, voriconazole  · certain medicines for migraine headache like almotriptan, eletriptan, frovatriptan, naratriptan, rizatriptan, sumatriptan,  zolmitriptan  · certain medicines for nausea or vomiting like dolasetron, ondansetron, palonosetron  · certain medicines for Parkinson's disease like benztropine, trihexyphenidyl  · certain medicines for seizures like phenobarbital, phenytoin, primidone  · certain medicines for stomach problems like dicyclomine, hyoscyamine  · certain medicines for travel sickness like scopolamine  · diuretics  · general anesthetics like halothane, isoflurane, methoxyflurane, propofol  · ipratropium  · local anesthetics like lidocaine, pramoxine, tetracaine  · MAOIs like Carbex, Eldepryl, Marplan, Nardil, and Parnate  · medicines that relax muscles for surgery  · methylene blue  · nilotinib  · other medicines with acetaminophen  · other narcotic medicines for pain or cough  · phenothiazines like chlorpromazine, mesoridazine, prochlorperazine, thioridazine  What if I miss a dose?  If you miss a dose, take it as soon as you can. If it is almost time for your next dose, take only that dose. Do not take double or extra doses.  Where should I keep my medicine?  Keep out of the reach of children. This medicine can be abused. Keep your medicine in a safe place to protect it from theft. Do not share this medicine with anyone. Selling or giving away this medicine is dangerous and against the law.  This medicine may cause accidental overdose and death if it taken by other adults, children, or pets. Mix any unused medicine with a substance like cat litter or coffee grounds. Then throw the medicine away in a sealed container like a sealed bag or a coffee can with a lid. Do not use the medicine after the expiration date.  Store at room temperature between 20 and 25 degrees C (68 and 77 degrees F).  What should I tell my health care provider before I take this medicine?  They need to know if you have any of these conditions:  · brain tumor  · Crohn's disease, inflammatory bowel disease, or ulcerative colitis  · drug abuse or addiction  · head  injury  · heart or circulation problems  · if you often drink alcohol  · kidney disease or problems going to the bathroom  · liver disease  · lung disease, asthma, or breathing problems  · an unusual or allergic reaction to acetaminophen, oxycodone, other opioid analgesics, other medicines, foods, dyes, or preservatives  · pregnant or trying to get pregnant  · breast-feeding  What should I watch for while using this medicine?  Tell your doctor or health care professional if your pain does not go away, if it gets worse, or if you have new or a different type of pain. You may develop tolerance to the medicine. Tolerance means that you will need a higher dose of the medication for pain relief. Tolerance is normal and is expected if you take this medicine for a long time.  Do not suddenly stop taking your medicine because you may develop a severe reaction. Your body becomes used to the medicine. This does NOT mean you are addicted. Addiction is a behavior related to getting and using a drug for a non-medical reason. If you have pain, you have a medical reason to take pain medicine. Your doctor will tell you how much medicine to take. If your doctor wants you to stop the medicine, the dose will be slowly lowered over time to avoid any side effects.  There are different types of narcotic medicines (opiates). If you take more than one type at the same time or if you are taking another medicine that also causes drowsiness, you may have more side effects. Give your health care provider a list of all medicines you use. Your doctor will tell you how much medicine to take. Do not take more medicine than directed. Call emergency for help if you have problems breathing or unusual sleepiness.  Do not take other medicines that contain acetaminophen with this medicine. Always read labels carefully. If you have questions, ask your doctor or pharmacist.  If you take too much acetaminophen get medical help right away. Too much  acetaminophen can be very dangerous and cause liver damage. Even if you do not have symptoms, it is important to get help right away.  You may get drowsy or dizzy. Do not drive, use machinery, or do anything that needs mental alertness until you know how this medicine affects you. Do not stand or sit up quickly, especially if you are an older patient. This reduces the risk of dizzy or fainting spells. Alcohol may interfere with the effect of this medicine. Avoid alcoholic drinks.  The medicine will cause constipation. Try to have a bowel movement at least every 2 to 3 days. If you do not have a bowel movement for 3 days, call your doctor or health care professional.  Your mouth may get dry. Chewing sugarless gum or sucking hard candy, and drinking plenty or water may help. Contact your doctor if the problem does not go away or is severe.  Date Last Reviewed:   NOTE:This sheet is a summary. It may not cover all possible information. If you have questions about this medicine, talk to your doctor, pharmacist, or health care provider. Copyright© 2016 [employer]

## 2017-05-01 NOTE — BRIEF OP NOTE
[facility] -   Brief Operative Note     SUMMARY     Surgery Date: 5/1/2017     Surgeon(s) and Role:     * Yohana Dutton MD - Primary    Assisting Surgeon: None    Pre-op Diagnosis:  Cyst of left ovary [N83.202]    Post-op Diagnosis:  Post-Op Diagnosis Codes:     * Cyst of left ovary [N83.202]    Procedure(s) (LRB):  LAPAROSCOPIC CYSTECTOMY-OVARIAN (Left)  FULGURATION-ENDOMETRIOSIS (N/A)    Anesthesia: General    Description of the findings of the procedure: Upper abdomen appears normal.  Liver edge smooth and normal.  3.5cm cyst on the left ovary filled with chocolate appearing fluid consistent with endometrioma versus old hemorrhagic cyst.  Multiple powder burn lesions in the posterior cul de sac and bilateral uterosacral ligaments and on the right ovary.  Bilateral fallopian tubes appear normal      Estimated Blood Loss: 15 mL         Specimens:   Specimen (12h ago through future)    Start     Ordered    05/01/17 1048  Specimen to Pathology - Surgery  Once     Comments:  1) Left Ovarian Cyst Wall (perm)    Dx: Left Ovarian Cyst    05/01/17 1048          Discharge Note    SUMMARY     Admit Date: 5/1/2017    Discharge Date and Time:  05/01/2017 11:00 AM    Hospital Course (synopsis of major diagnoses, care, treatment, and services provided during the course of the hospital stay): The patient underwent a scheduled, elective laparoscopic left ovarian cystectomy and endometriosis fulguration.  She will be discharged home from PACU in stable condition.       Final Diagnosis: Post-Op Diagnosis Codes:     * Cyst of left ovary [N83.202]    Disposition: Home or Self Care    Follow Up/Patient Instructions:     Medications:  Reconciled Home Medications:   Current Discharge Medication List      START taking these medications    Details   oxycodone-acetaminophen (PERCOCET) 5-325 mg per tablet Take 1 tablet by mouth every 4 (four) hours as needed for Pain.  Qty: 30 tablet, Refills: 0      promethazine (PHENERGAN)  12.5 MG Tab Take 1 tablet (12.5 mg total) by mouth every 6 (six) hours as needed (nausea).  Qty: 30 tablet, Refills: 0         CONTINUE these medications which have NOT CHANGED    Details   ferrous gluconate (FERGON) 325 MG Tab Take 1 tablet (325 mg total) by mouth daily with breakfast.  Qty: 30 tablet, Refills: 3    Associated Diagnoses: Iron deficiency anemia secondary to inadequate dietary iron intake             Discharge Procedure Orders  Diet general     Activity as tolerated     Lifting restrictions   Order Comments: No lifting >20 pounds     No dressing needed     Call MD for:  temperature >100.4     Call MD for:  persistent nausea and vomiting or diarrhea     Call MD for:  severe uncontrolled pain     Call MD for:  redness, tenderness, or signs of infection (pain, swelling, redness, odor or green/yellow discharge around incision site)     Call MD for:  difficulty breathing or increased cough     Call MD for:  severe persistent headache     Call MD for:  worsening rash     Call MD for:  persistent dizziness, light-headedness, or visual disturbances     Call MD for:  increased confusion or weakness       Follow-up Information     Follow up with Yohana Dutton MD On 5/30/2017.    Specialties:  Obstetrics and Gynecology, Obstetrics    Why:  post-op check    Contact information:    [address] [place] [address] [address] Joya [address]IVE [id number]  [phone]

## 2017-05-01 NOTE — PLAN OF CARE
Pt and pt mother and friend were given discharge instructions. All stated understanding. Pt to be brought to main entrance via wheelchair with RN.

## 2017-05-01 NOTE — TRANSFER OF CARE
"Anesthesia Transfer of Care Note    Patient: Flavia Patricio    Procedure(s) Performed: Procedure(s) (LRB):  LAPAROSCOPIC CYSTECTOMY-OVARIAN (Left)  FULGURATION-ENDOMETRIOSIS (N/A)    Patient location: PACU    Anesthesia Type: general    Transport from OR: Transported from OR on room air with adequate spontaneous ventilation    Post pain: adequate analgesia    Post assessment: no apparent anesthetic complications    Post vital signs: stable    Level of consciousness: awake    Nausea/Vomiting: no nausea/vomiting    Complications: none          Last vitals:   Visit Vitals    /70 (BP Location: Right arm, Patient Position: Sitting, BP Method: Automatic)    Pulse 80    Temp 37.1 °C (98.8 °F) (Oral)    Resp 18    Ht 5' 6" (1.676 m)    Wt 51.4 kg (113 lb 5.1 oz)    SpO2 100%    Breastfeeding No    BMI 18.29 kg/m2     "

## 2017-05-01 NOTE — OP NOTE
Operative Note       SURGERY DATE:  5/1/2017     PRE-OP DIAGNOSIS:  Cyst of left ovary [N83.202]    POST-OP DIAGNOSIS:  Cyst of left ovary [N83.202]    Procedure(s) (LRB):  LAPAROSCOPIC CYSTECTOMY-OVARIAN (Left)  FULGURATION-ENDOMETRIOSIS (N/A)    Surgeon(s) and Role:     * Yohana Dutton MD - Primary    ASSISTANT:  Yaa Jordan    TASKS PERFORMED BY ASSISTANT:  Retraction, Exposure, Hemostasis, Closure    ANESTHESIA: General    FINDINGS: Upper abdomen appears normal.  Liver edge smooth and normal.  3.5cm cyst on the left ovary filled with chocolate appearing fluid consistent with endometrioma versus old hemorrhagic cyst.  Multiple powder burn lesions in the posterior cul de sac and bilateral uterosacral ligaments and on the right ovary.  Bilateral fallopian tubes appear normal      GRAFTS/IMPLANTS:  None    ESTIMATED BLOOD LOSS: 15 mL              COMPLICATIONS:  None    SPECIMEN:  left ovarian cyst wall    INDICATION:  Pelvic pain, persistent left ovarian cyst    DESCRIPTION OF PROCEDURE:    The patient was taken to the Operating Room where GETA was induced and found to be adequate. She was then placed in the dorsal lithotomy position with her legs in the Shaun stirrups and her arms tucked at her sides.  Her perineum was prepped and draped in the normal, sterile fashion, and a Caruso catheter was placed in her bladder and hung to gravity.  A Zumi intrauterine manipulator was then placed in the usual fashion.  Her abdomen was then prepped and draped in the normal, sterile fashion, and her legs were placed in the low lithotomy position.  Time out was performed.    The periumbilical skin was tented with perforating towel clamps, and a Verress needle was inserted through the umbilicus into the intraperitoneal cavity.  Intraperitoneal placement was confirmed with a water drop test, and pneumoperitoneum was achieved with Carbon Dioxide gas up to a pressure of 15 mmHg.  The verress needle was removed, and a 5 mm skin  incision was made in the umbilicus. A 5 mm trocar was inserted through this incision, and the scope was inserted through this trocar.  Immediate inspection of underlying organs revealed no damage or injury.  She was then placed in Trendelenburg position.  The pelvic organs were examined, and the findings stated above were noted.  A 5mm accessory trocar was placed in the left lower quadrant, and an 8mm trocar was placed in the right lower quadrant.   The ovarian tissue overlying the left ovarian cyst was scored with the hot scissors.  A copious amount of chocolate appearing liquid drained from the cyst.  This was suctioned.  The cyst wall was then dissected out bluntly and removed through the 8mm trocar.  It was sent to pathology for permanent section.  All visible powder burn lesions in the posterior cul de sac and on the right ovary were fulgurated with monopolar cautery.  The base of the ovary where the cyst was located was then cauterized to achieve hemostasis and Surgicel was placed there.  The pelvis was copiously irrigated and cleared of all clots and debris.  The bilateral ureters were visualized in their normal anatomic locations bilaterally and were vermiculating at the beginning and end of the procedure.    All instruments were removed from the trocars and pneumoperitoneum was allowed to escape.  The patient was taken out of Trendelenburg position, and all trocars were removed.  Hemostasis at all skin sites was achieved with the Bovie cautery.  All skin sites were closed with 4-0 Vicryl in a running, subcuticular fashion.  All instruments were removed from the vagina.  Some oozing on the cervix was noted where the tenaculum had been placed.  Bleeding here was controlled with a 3-0 chromic figure of 8 suture.  Sponge, laparotomy sponge, and needle counts were correct.  She will go to recovery in stable condition.             CONDITION: Good    DISPOSITION: PACU - hemodynamically stable.

## 2017-05-01 NOTE — ANESTHESIA POSTPROCEDURE EVALUATION
"Anesthesia Post Evaluation    Patient: Flavia Patricio    Procedure(s) Performed: Procedure(s) (LRB):  LAPAROSCOPIC CYSTECTOMY-OVARIAN (Left)  FULGURATION-ENDOMETRIOSIS (N/A)    Final Anesthesia Type: general  Patient location during evaluation: PACU  Patient participation: Yes- Able to Participate  Level of consciousness: awake and alert and oriented  Post-procedure vital signs: reviewed and stable  Pain management: adequate  Airway patency: patent  PONV status at discharge: No PONV  Anesthetic complications: no      Cardiovascular status: blood pressure returned to baseline  Respiratory status: unassisted  Hydration status: euvolemic  Follow-up not needed.        Visit Vitals    BP (!) 148/93    Pulse 88    Temp 36.5 °C (97.7 °F) (Temporal)    Resp 12    Ht 5' 6" (1.676 m)    Wt 51.4 kg (113 lb 5.1 oz)    SpO2 100%    Breastfeeding No    BMI 18.29 kg/m2       Pain/Fatmata Score: Pain Assessment Performed: Yes (5/1/2017 12:23 PM)  Presence of Pain: complains of pain/discomfort (5/1/2017 12:23 PM)  Pain Rating Prior to Med Admin: 6 (5/1/2017 12:13 PM)  Fatmata Score: 8 (5/1/2017 12:23 PM)      "

## 2017-05-01 NOTE — IP AVS SNAPSHOT
[address] [place] [address]           Patient Discharge Instructions   Our goal is to set you up for success. This packet includes information on your condition, medications, and your home care.  It will help you care for yourself to prevent having to return to the hospital.     Please ask your nurse if you have any questions.      There are many details to remember when preparing to leave the hospital. Here is what you will need to do:    1. Take your medicine. If you are prescribed medications, review your Medication List on the following pages. You may have new medications to  at the pharmacy and others that you'll need to stop taking. Review the instructions for how and when to take your medications. Talk with your doctor or nurses if you are unsure of what to do.     2. Go to your follow-up appointments. Specific follow-up information is listed in the following pages. Your may be contacted by a nurse or clinical provider about future appointments. Be sure we have all of the phone numbers to reach you. Please contact your provider's office if you are unable to make an appointment.     3. Watch for warning signs. Your doctor or nurse will give you detailed warning signs to watch for and when to call for assistance. These instructions may also include educational information about your condition. If you experience any of warning signs to your health, call your doctor.               ** Verify the list of medication(s) below is accurate and up to date. Carry this with you in case of emergency. If your medications have changed, please notify your healthcare provider.             Medication List      START taking these medications        Additional Info                      oxycodone-acetaminophen 5-325 mg per tablet   Commonly known as:  PERCOCET   Quantity:  30 tablet   Refills:  0   Dose:  1 tablet    Instructions:  Take 1 tablet by mouth every 4 (four)  hours as needed for Pain.     Begin Date    AM    Noon    PM    Bedtime       promethazine 12.5 MG Tab   Commonly known as:  PHENERGAN   Quantity:  30 tablet   Refills:  0   Dose:  12.5 mg    Instructions:  Take 1 tablet (12.5 mg total) by mouth every 6 (six) hours as needed (nausea).     Begin Date    AM    Noon    PM    Bedtime         CONTINUE taking these medications        Additional Info                      ferrous gluconate 325 MG Tab   Commonly known as:  FERGON   Quantity:  30 tablet   Refills:  3   Dose:  325 mg    Instructions:  Take 1 tablet (325 mg total) by mouth daily with breakfast.     Begin Date    AM    Noon    PM    Bedtime            Where to Get Your Medications      These medications were sent to [email or web address] [address] [address] [address]    Hours:  24-hours Phone:  [phone]     oxycodone-acetaminophen 5-325 mg per tablet    promethazine 12.5 MG Tab                  Please bring to all follow up appointments:    1. A copy of your discharge instructions.  2. All medicines you are currently taking in their original bottles.  3. Identification and insurance card.    Please arrive 15 minutes ahead of scheduled appointment time.    Please call 24 hours in advance if you must reschedule your appointment and/or time.        Your Scheduled Appointments     May 30, 2017  8:45 AM CDT   Post OP with MD Coni Vasquez - OB/ GYN ([facility])    [address] [place] [id number]   [phone]            Jul 12, 2017 10:00 AM CDT   Nurse Visit with OB GYN NURSEELO - OB/ GYN ([facility])    [address] [place] [id number]   [phone]              Follow-up Information     Follow up with Yohana Dutton MD On 5/30/2017.    Specialties:  Obstetrics and Gynecology, Obstetrics    Why:  post-op check    Contact information:    [address] [place] [address]  [address] [place] [address]  [phone]          Discharge Instructions     Future Orders    Activity as tolerated     Call MD for:  difficulty breathing or increased cough     Call MD for:  increased confusion or weakness     Call MD for:  persistent dizziness, light-headedness, or visual disturbances     Call MD for:  persistent nausea and vomiting or diarrhea     Call MD for:  redness, tenderness, or signs of infection (pain, swelling, redness, odor or green/yellow discharge around incision site)     Call MD for:  severe persistent headache     Call MD for:  severe uncontrolled pain     Call MD for:  temperature >100.4     Call MD for:  worsening rash     Diet general     Questions:    Total calories:      Fat restriction, if any:      Protein restriction, if any:      Na restriction, if any:      Fluid restriction:      Additional restrictions:      Lifting restrictions     Comments:    No lifting >20 pounds    No dressing needed         Discharge Instructions         General Information:    1.  Do not drink alcoholic beverages including beer for 24 hours or as long as you are on pain medication..  2.  Do not drive a motor vehicle, operate machinery or power tools, or signs legal papers for 24 hours or as long as you are on pain medication.   3.  You may experience light-headedness, dizziness, and sleepiness following surgery. Please do not stay alone. A responsible adult should be with you for this 24 hour period.  4.  Go home and rest.    5. Progress slowly to a normal diet unless instructed.  Otherwise, begin with liquids such as soft drinks, then soup and crackers working up to solid foods. Drink plenty of nonalcoholic fluids.  6.  Certain anesthetics and pain medications produce nausea and vomiting in certain       individuals. If nausea becomes a problem at home, call you doctor.    7. A nurse will be calling you sometime after surgery. Do not be alarmed. This is our way of finding out how you are  doing.    8. Several times every hour while you are awake, take 2-3 deep breaths and cough. If you had stomach surgery hold a pillow or rolled towel firmly against your stomach before you cough. This will help with any pain the cough might cause.  9. Several times every hour while you are awake, pump and flex your feet 5-6 times and do foot circles. This will help prevent blood clots.    10.Call your doctor for severe pain, bleeding, fever, or signs or symptoms of infection (pain, swelling, redness, foul odor, drainage).    11.You can contact your doctor anytime by [phone] for the [facility] (at [facility]) or [phone] for the [facility] on [facility].   [email or web address] is another way to contact your doctor if you are an active participant online with [employer].        Understanding Ovarian Cystectomy    An ovarian cystectomy is a type of surgery. It removes a cyst from your ovaries. A cyst is a sac full of fluid. You have two ovaries, one on each side of your uterus. The ovaries make your eggs (ova). They also make the hormone estrogen.  How to say it  [id number]   Why ovarian cystectomy is done  This procedure is done to remove a cyst on an ovary. Its usually done only if the cyst is large or painful. Many women have cysts on their ovaries. They are often benign. But they can be cancerous.  How ovarian cystectomy is done  This procedure is often done in a hospital. You may need to spend a few days in the hospital after the cyst is removed. During the procedure:  · You are given medicine to make you fall asleep. You wont feel any pain.  · The surgeon makes a cut (incision) in your belly (abdomen) to reach your reproductive organs.  · The surgeon puts a clamp on the ovary to hold it still.  · The surgeon carefully cuts into the ovary, revealing the cyst.  · The surgeon may drain the cyst. It is then cut away from the ovary.  · The cyst may be sent to a lab to check for  disease, such as cancer.  · The surgeon stops any bleeding from the ovary. He or she then closes the cut in your belly.  Risks of ovarian cystectomy  · Bleeding  · Damage to an ovary  · Infection  · Injury to the bladder  Date Last Reviewed: 6/1/2016 © 2000-2016 [employer]. [address] [place] [address], [place] [address]. All rights reserved. This information is not intended as a substitute for professional medical care. Always follow your healthcare professional's instructions.        Acetaminophen; Oxycodone tablets  What is this medicine?  ACETAMINOPHEN; OXYCODONE (a set a YUAN luz elena fen; ox i KOE done) is a pain reliever. It is used to treat moderate to severe pain.  How should I use this medicine?  Take this medicine by mouth with a full glass of water. Follow the directions on the prescription label. You can take it with or without food. If it upsets your stomach, take it with food. Take your medicine at regular intervals. Do not take it more often than directed.  A special MedGuide will be given to you by the pharmacist with each prescription and refill. Be sure to read this information carefully each time.  Talk to your pediatrician regarding the use of this medicine in children. Special care may be needed.  What side effects may I notice from receiving this medicine?  Side effects that you should report to your doctor or health care professional as soon as possible:  · allergic reactions like skin rash, itching or hives, swelling of the face, lips, or tongue  · breathing problems  · confusion  · redness, blistering, peeling or loosening of the skin, including inside the mouth  · signs and symptoms of liver injury like dark yellow or brown urine; general ill feeling or flu-like symptoms; light-colored stools; loss of appetite; nausea; right upper belly pain; unusually weak or tired; yellowing of the eyes or skin  · signs and symptoms of low blood pressure like dizziness; feeling faint or lightheaded,  falls; unusually weak or tired  · trouble passing urine or change in the amount of urine  Side effects that usually do not require medical attention (report to your doctor or health care professional if they continue or are bothersome):  · constipation  · dry mouth  · nausea, vomiting  · tiredness  What may interact with this medicine?  This medicine may interact with the following medications:  · alcohol  · antihistamines for allergy, cough and cold  · antiviral medicines for HIV or AIDS  · atropine  · certain antibiotics like clarithromycin, erythromycin, linezolid, rifampin  · certain medicines for anxiety or sleep  · certain medicines for bladder problems like oxybutynin, tolterodine  · certain medicines for depression like amitriptyline, fluoxetine, sertraline  · certain medicines for fungal infections like ketoconazole, itraconazole, voriconazole  · certain medicines for migraine headache like almotriptan, eletriptan, frovatriptan, naratriptan, rizatriptan, sumatriptan, zolmitriptan  · certain medicines for nausea or vomiting like dolasetron, ondansetron, palonosetron  · certain medicines for Parkinson's disease like benztropine, trihexyphenidyl  · certain medicines for seizures like phenobarbital, phenytoin, primidone  · certain medicines for stomach problems like dicyclomine, hyoscyamine  · certain medicines for travel sickness like scopolamine  · diuretics  · general anesthetics like halothane, isoflurane, methoxyflurane, propofol  · ipratropium  · local anesthetics like lidocaine, pramoxine, tetracaine  · MAOIs like Carbex, Eldepryl, Marplan, Nardil, and Parnate  · medicines that relax muscles for surgery  · methylene blue  · nilotinib  · other medicines with acetaminophen  · other narcotic medicines for pain or cough  · phenothiazines like chlorpromazine, mesoridazine, prochlorperazine, thioridazine  What if I miss a dose?  If you miss a dose, take it as soon as you can. If it is almost time for your next  dose, take only that dose. Do not take double or extra doses.  Where should I keep my medicine?  Keep out of the reach of children. This medicine can be abused. Keep your medicine in a safe place to protect it from theft. Do not share this medicine with anyone. Selling or giving away this medicine is dangerous and against the law.  This medicine may cause accidental overdose and death if it taken by other adults, children, or pets. Mix any unused medicine with a substance like cat litter or coffee grounds. Then throw the medicine away in a sealed container like a sealed bag or a coffee can with a lid. Do not use the medicine after the expiration date.  Store at room temperature between 20 and 25 degrees C (68 and 77 degrees F).  What should I tell my health care provider before I take this medicine?  They need to know if you have any of these conditions:  · brain tumor  · Crohn's disease, inflammatory bowel disease, or ulcerative colitis  · drug abuse or addiction  · head injury  · heart or circulation problems  · if you often drink alcohol  · kidney disease or problems going to the bathroom  · liver disease  · lung disease, asthma, or breathing problems  · an unusual or allergic reaction to acetaminophen, oxycodone, other opioid analgesics, other medicines, foods, dyes, or preservatives  · pregnant or trying to get pregnant  · breast-feeding  What should I watch for while using this medicine?  Tell your doctor or health care professional if your pain does not go away, if it gets worse, or if you have new or a different type of pain. You may develop tolerance to the medicine. Tolerance means that you will need a higher dose of the medication for pain relief. Tolerance is normal and is expected if you take this medicine for a long time.  Do not suddenly stop taking your medicine because you may develop a severe reaction. Your body becomes used to the medicine. This does NOT mean you are addicted. Addiction is a  behavior related to getting and using a drug for a non-medical reason. If you have pain, you have a medical reason to take pain medicine. Your doctor will tell you how much medicine to take. If your doctor wants you to stop the medicine, the dose will be slowly lowered over time to avoid any side effects.  There are different types of narcotic medicines (opiates). If you take more than one type at the same time or if you are taking another medicine that also causes drowsiness, you may have more side effects. Give your health care provider a list of all medicines you use. Your doctor will tell you how much medicine to take. Do not take more medicine than directed. Call emergency for help if you have problems breathing or unusual sleepiness.  Do not take other medicines that contain acetaminophen with this medicine. Always read labels carefully. If you have questions, ask your doctor or pharmacist.  If you take too much acetaminophen get medical help right away. Too much acetaminophen can be very dangerous and cause liver damage. Even if you do not have symptoms, it is important to get help right away.  You may get drowsy or dizzy. Do not drive, use machinery, or do anything that needs mental alertness until you know how this medicine affects you. Do not stand or sit up quickly, especially if you are an older patient. This reduces the risk of dizzy or fainting spells. Alcohol may interfere with the effect of this medicine. Avoid alcoholic drinks.  The medicine will cause constipation. Try to have a bowel movement at least every 2 to 3 days. If you do not have a bowel movement for 3 days, call your doctor or health care professional.  Your mouth may get dry. Chewing sugarless gum or sucking hard candy, and drinking plenty or water may help. Contact your doctor if the problem does not go away or is severe.  Date Last Reviewed:   NOTE:This sheet is a summary. It may not cover all possible information. If you have  "questions about this medicine, talk to your doctor, pharmacist, or health care provider. Copyright© 2016 [employer]          Discharge References/Attachments     ENDOMETRIOSIS, LAPAROSCOPIC TREATMENT OF, DISCHARGE INSTRUCTIONS (ENGLISH)    PAIN MANAGEMENT AFTER SURGERY (ENGLISH)        Primary Diagnosis     Your primary diagnosis was:  Cyst Of Left Ovary      Admission Information     Date & Time Provider Department CSN    5/1/2017  8:16 AM Yohana Dutton MD [facility] -  [id number]      Care Providers     Provider Role Specialty Primary office phone    Yohana Dutton MD Attending Provider Obstetrics and Gynecology [phone]    Yohana Dutton MD Surgeon  Obstetrics and Gynecology [phone]      Your Vitals Were     BP Pulse Temp Resp Height Weight    151/82 93 97.9 °F (36.6 °C) (Temporal) 12 5' 6" (1.676 m) 51.4 kg (113 lb 5.1 oz)    SpO2 BMI             100% 18.29 kg/m2         Recent Lab Values     No lab values to display.      Pending Labs     Order Current Status    Specimen to Pathology - Surgery Collected (05/01/17 1048)      Allergies as of 5/1/2017     No Known Allergies      [facility]     [facility] Nurse Care Line - 24/7 Assistance  Unless otherwise directed by your provider, please contact [facility], our nurse care line that is available for 24/7 assistance.     Registered nurses in the [facility] provide clinical advisement, health education, appointment booking, and other advisory services.  Call for this free service at [phone].        Advance Directives     An advance directive is a document which, in the event you are no longer able to make decisions for yourself, tells your healthcare team what kind of treatment you do or do not want to receive, or who you would like to make those decisions for you.  If you do not currently have an advance directive, [facility] encourages you to create one.  For more information call:  [phone] " [phone]), [phone] ([phone]),  or log on to [email or web address].        Smoking Cessation     If you would like to quit smoking:   You may be eligible for free services if you are a [place] resident and started smoking cigarettes before September 1, 1988.  Call the Smoking Cessation Trust (SCT) toll free at (455) 584-2523 or (899) 031-1111.   Call 1-800-QUIT-NOW if you do not meet the above criteria.   Contact us via email: [email or web address]   View our website for more information: [email or web address]        Language Assistance Services     ATTENTION: Language assistance services are available, free of charge. Please call [phone].      ATENCIÓN: Si habla miguel, tiene a larry disposición servicios gratuitos de asistencia lingüística. Llame al [phone].     CHÚ Ý: N?u b?n nói Ti?ng Vi?t, có các d?ch v? h? tr? ngôn ng? mi?n phí dành cho b?n. G?i s? [phone].         [facility] complies with applicable Federal civil rights laws and does not discriminate on the basis of race, color, national origin, age, disability, or sex.

## 2017-05-08 ENCOUNTER — TELEPHONE (OUTPATIENT)
Dept: OBSTETRICS AND GYNECOLOGY | Facility: CLINIC | Age: 27
End: 2017-05-08

## 2017-05-16 ENCOUNTER — TELEPHONE (OUTPATIENT)
Dept: OBSTETRICS AND GYNECOLOGY | Facility: CLINIC | Age: 27
End: 2017-05-16

## 2017-05-16 NOTE — TELEPHONE ENCOUNTER
Pt is needing a return to work letter. Please advise if pt may return to work with or with out restrictions from her surgery on 5/1/17.

## 2017-05-16 NOTE — TELEPHONE ENCOUNTER
----- Message from Jerri Nguyễn sent at 5/16/2017 11:11 AM CDT -----  Contact: self [phone]  States that she is calling to check status on her release to return to work. States that she has to have release before they will let her return to work and that her leave was up on Sunday.  Please call back at [phone]//thank you acc

## 2017-05-24 ENCOUNTER — HOSPITAL ENCOUNTER (EMERGENCY)
Facility: HOSPITAL | Age: 27
Discharge: HOME | End: 2017-05-24
Payer: COMMERCIAL

## 2017-05-24 VITALS
WEIGHT: 121 LBS | BODY MASS INDEX: 19.44 KG/M2 | SYSTOLIC BLOOD PRESSURE: 136 MMHG | DIASTOLIC BLOOD PRESSURE: 78 MMHG | RESPIRATION RATE: 18 BRPM | HEART RATE: 100 BPM | HEIGHT: 66 IN | TEMPERATURE: 98 F | OXYGEN SATURATION: 97 %

## 2017-05-24 DIAGNOSIS — M79.673 FOOT PAIN: ICD-10-CM

## 2017-05-24 DIAGNOSIS — S90.32XA CONTUSION OF LEFT FOOT, INITIAL ENCOUNTER: Primary | ICD-10-CM

## 2017-05-24 PROCEDURE — 99283 EMERGENCY DEPT VISIT LOW MDM: CPT

## 2017-05-24 RX ORDER — DICLOFENAC SODIUM 75 MG/1
75 TABLET, DELAYED RELEASE ORAL 2 TIMES DAILY
Qty: 20 TABLET | Refills: 0 | Status: SHIPPED | OUTPATIENT
Start: 2017-05-24 | End: 2018-05-30

## 2017-05-24 NOTE — ED PROVIDER NOTES
"Encounter Date: 5/24/2017       History     Chief Complaint   Patient presents with    Foot Injury     metal rack fell on her foot.      Review of patient's allergies indicates:  No Known Allergies  The history is provided by the patient.   Foot Injury    The incident occurred at work. The injury mechanism was a direct blow. The incident occurred just prior to arrival. The pain is present in the left foot. The quality of the pain is described as aching. The pain is at a severity of 3/10. The pain has been constant since onset. Associated symptoms include inability to bear weight and loss of motion. Pertinent negatives include no numbness, no muscle weakness, no loss of sensation and no tingling. She reports no foreign bodies present. The symptoms are aggravated by activity, bearing weight and palpation. She has tried nothing for the symptoms.     Past Medical History:   Diagnosis Date    Anemia     Genital herpes complicating pregnancy in second trimester 2/16/2015    GERD (gastroesophageal reflux disease)     Insomnia     Miscarriage     Pelvic inflammatory disease (PID)     Restless leg syndrome      Past Surgical History:   Procedure Laterality Date    COLONOSCOPY W/ POLYPECTOMY      x 1    WISDOM TOOTH EXTRACTION       Family History   Problem Relation Age of Onset    Colon cancer Maternal Grandfather     Breast cancer Cousin     Heart attack Maternal Uncle     Heart attack Maternal Uncle     Heart attack Maternal Uncle     Heart attack Maternal Uncle     Heart attack Maternal Uncle      Social History   Substance Use Topics    Smoking status: Light Tobacco Smoker    Smokeless tobacco: Not on file      Comment: pt states "i smoke marijuana twice a day"    Alcohol use Yes      Comment: rare     Review of Systems   Constitutional: Negative for chills and fever.   HENT: Negative for sore throat.    Eyes: Negative for photophobia and redness.   Respiratory: Negative for cough and shortness of " breath.    Cardiovascular: Negative for chest pain.   Gastrointestinal: Negative for abdominal pain, diarrhea and nausea.   Endocrine: Negative for polydipsia and polyphagia.   Genitourinary: Negative for dysuria.   Musculoskeletal: Negative for arthralgias, back pain and myalgias.        Left foot pain   Skin: Negative for rash.   Neurological: Negative for tingling, weakness, numbness and headaches.   Hematological: Does not bruise/bleed easily.   Psychiatric/Behavioral: The patient is not nervous/anxious.    All other systems reviewed and are negative.      Physical Exam     Initial Vitals [05/24/17 1017]   BP Pulse Resp Temp SpO2   136/78 100 18 98 °F (36.7 °C) 97 %     Physical Exam    Nursing note and vitals reviewed.  Constitutional: Vital signs are normal. She appears well-developed and well-nourished. No distress.   HENT:   Head: Normocephalic and atraumatic.   Right Ear: External ear normal.   Left Ear: External ear normal.   Nose: Nose normal.   Mouth/Throat: Oropharynx is clear and moist.   Eyes: Conjunctivae, EOM and lids are normal. Pupils are equal, round, and reactive to light.   Neck: Normal range of motion and full passive range of motion without pain. Neck supple.   Cardiovascular: Normal rate, regular rhythm, S1 normal, S2 normal, normal heart sounds, intact distal pulses and normal pulses.   Pulmonary/Chest: Breath sounds normal. No respiratory distress. She has no wheezes. She has no rales.   Abdominal: Soft. Normal appearance and bowel sounds are normal. She exhibits no distension. There is no tenderness.   Musculoskeletal:        Left foot: There is decreased range of motion, tenderness and bony tenderness. There is no swelling, normal capillary refill, no crepitus, no deformity and no laceration.        Feet:    Lymphadenopathy:     She has no cervical adenopathy.   Neurological: She is alert and oriented to person, place, and time. She has normal strength. No cranial nerve deficit or  sensory deficit. Coordination and gait normal.   Skin: Skin is warm, dry and intact.   Psychiatric: She has a normal mood and affect. Her speech is normal and behavior is normal. Judgment and thought content normal. Cognition and memory are normal.         ED Course   Procedures  Labs Reviewed - No data to display                            ED Course     Clinical Impression:   The primary encounter diagnosis was Contusion of left foot, initial encounter. A diagnosis of Foot pain was also pertinent to this visit.          AKOSUA Coe  05/24/17 1110

## 2017-05-30 ENCOUNTER — OFFICE VISIT (OUTPATIENT)
Dept: OBSTETRICS AND GYNECOLOGY | Facility: CLINIC | Age: 27
End: 2017-05-30
Payer: COMMERCIAL

## 2017-05-30 VITALS
WEIGHT: 114.88 LBS | HEIGHT: 66 IN | SYSTOLIC BLOOD PRESSURE: 116 MMHG | DIASTOLIC BLOOD PRESSURE: 70 MMHG | BODY MASS INDEX: 18.46 KG/M2

## 2017-05-30 DIAGNOSIS — N80.30 ENDOMETRIOSIS OF PELVIC PERITONEUM: ICD-10-CM

## 2017-05-30 DIAGNOSIS — Z98.890 STATUS POST LAPAROSCOPY: Primary | ICD-10-CM

## 2017-05-30 DIAGNOSIS — N92.1 BREAKTHROUGH BLEEDING ON DEPO PROVERA: ICD-10-CM

## 2017-05-30 PROBLEM — N83.202 LEFT OVARIAN CYST: Status: RESOLVED | Noted: 2017-01-18 | Resolved: 2017-05-30

## 2017-05-30 PROCEDURE — 99999 PR PBB SHADOW E&M-EST. PATIENT-LVL III: CPT | Mod: PBBFAC,,, | Performed by: OBSTETRICS & GYNECOLOGY

## 2017-05-30 PROCEDURE — 99024 POSTOP FOLLOW-UP VISIT: CPT | Mod: S$GLB,,, | Performed by: OBSTETRICS & GYNECOLOGY

## 2017-05-30 NOTE — PATIENT INSTRUCTIONS
Endometriosis    Endometriosis is a condition that occurs when the tissue that lines the uterus begins to grow where it should not. The tissue may grow on the outside surface of the uterus, the ovaries, or fallopian tubes. It may also grow on the bowel, rectum, bladder, or other parts. The tissue responds to the same hormones that control your menstrual cycle. So it may swell and bleed just like the lining of the uterus, which is shed every month during your period. The swelling and blood irritate nearby tissues. This causes pelvic or lower abdominal (belly) pain and cramping. The pain is often worse around the time of your period. Pain during sex is also common. Some women have pain during bowel movements or urination. Over time, scar tissue may form. This scar tissue can bind organs together. It can also cause problems getting pregnant (infertility).  The exact cause of endometriosis is unknown. The best way to confirm a diagnosis is with a procedure called laparoscopy. This allows the provider to look inside the abdomen with a small tube and light.  Treatment depends on your symptoms. If pain is mild, no treatment may be needed. If pain is more severe, medicines may be advised. Surgery may also be an option. Your provider can tell you more as needed.  Home care  Medicines  To help manage endometriosis, any of the following medicines may be used:  · Pain relievers, such as non-steroidal anti-inflammatory drugs (NSAIDS)  · Hormonal medicines  ¨ Birth control pills  ¨ Progestin-only pills  ¨ Gonadotropin-releasing hormone (GnRH) agonists  ¨ Danazol  If youre prescribed medicines, be sure to take them as directed. Also, report any side effects you have to your provider.  General care  · Rest as needed when you have symptoms.  · To help ease pain or cramping, apply a heating pad to where the pain is. You may also use a hot water bottle.  Follow-up care  Follow up with your healthcare provider, or as directed.  When  to seek medical advice  Call your healthcare provider right away if any of these occur:  · Fever of 100.4°F (38°C) or higher, or as directed by your provider  · Abdominal or pelvic pain or cramping worsens, or doesnt improve with medicines  · Pain during sex, urination, or bowel movements continues  · Heavy bleeding (soaking 1 pad or tampon every hour for 3 hours)  · Missed periods (if they are usually regular)  · Sudden, severe pain in the abdomen  · Abdominal swelling with nausea or vomiting  · Blood in the urine or problems urinating  · Blood in the stool  · Dizziness, weakness, fainting  Note: If you have been unable to get pregnant after trying for 1 year, see your provider. He or she can talk with you about infertility testing.   Date Last Reviewed: 6/11/2015 © 2000-2016 [employer]. [address] [place] [address], [place] [address]. All rights reserved. This information is not intended as a substitute for professional medical care. Always follow your healthcare professional's instructions.        Living with Endometriosis     A hot bath may help relieve pain.    Once you know you have endometriosis, you can think about your options for treatment. Even after treatment, most women have symptoms off and on until menopause. Then, when monthly periods are over for good, symptoms tend to subside or disappear. In the meantime, there is a lot you can do to help yourself feel better.  Help with emotions  Along with cycles of pain, you may have emotional cycles or mood swings. You may feel frustrated, or depressed. Dont suffer in silence. Talking to someone you trust can really help. Also, spend time doing things you enjoy.  Pain control  Heat can help limit pain. Soak in a hot bath or use a heating pad. You may also find relief with yoga, meditation, or acupuncture. Acetaminophen and ibuprofen may also help. Those work best if taken just as pain begins. If needed, you may be given prescription medicine to  reduce cramping and pain during periods. Keep track of your symptoms to help you anticipate and cope with the pain.  Nutrition  For some women, making certain changes in their diet seems to reduce symptoms:  · Eat less refined sugar and white flour.  · Eat more dairy and get adequate vitamin D.  · Choose whole-grain breads and cereals.  · Eat at least 5 fruits and vegetables each day.  · Talk with your health care provider about taking nutritional supplements.  Pregnancy  Following treatment, many women with endometriosis are able to become pregnant. Some of these women find that being pregnant relieves symptoms -- at least for a while.  Exercise  Frequent exercise can help control your symptoms. Try to exercise about 2 hours and 30 minutes a week. Doing so can help relieve pain, including cramps. Nonimpact choices may offer the most symptom relief. Try walking, swimming, or biking.  Talking about sex  Many women with endometriosis have pain during intercourse. To increase comfort, you may want to try new positions for sex. Some times of the month may be better than others. Also, talk with your partner about other ways you can be intimate. Massage might be a good option for both of you.  Date Last Reviewed: 5/20/2015  © 8681-4256 [employer]. [address] [place] [address], [place] [address]. All rights reserved. This information is not intended as a substitute for professional medical care. Always follow your healthcare professional's instructions.        Treating Endometriosis     You and your healthcare provider may decide that medication is the best treatment for you at this time.     The tissue that lines your uterus is called the endometrium. Endometriosis is growth of this tissue in abnormal places, often outside the uterus. These growths are called endometrial implants. During the menstrual cycle, the endometrium swells. Any implants will swell too. This can cause symptoms, such as pain. The condition  can also cause trouble getting pregnant. But endometriosis can be treated. Hormones and surgery are the 2 most common options. Talk to your health care provider about what treatment plan is best for you.  Medical therapy  Estrogen and progesterone are the main hormones that control your menstrual cycle. Medicines can help control these hormones. This helps limit the swelling of all endometrial tissue. This treatment may be tried instead of surgery. Or, it may be used along with surgery. Some medical therapy prevents a woman from becoming pregnant. Common types of medical therapy include:  · GnRH agonists. These hormones stop the body from making estrogen. They help with pain and may be used with low doses of other hormones to help prevent side effects.  · Birth control pills. These prevent the hormone levels from fluctuating like they would during a normal menstrual cycle.  · Progestins. These are a form of progesterone. They help keep estrogen levels low.  · Danazol. This is a weak male hormone. It stops or lowers a womans production of estrogen and progesterone. This is less commonly used. A nonhormonal form of birth control must be used with this therapy.  · Nonsteroidal anti-inflammatory drugs (NSAIDs). These are analgesics that help with pain, but they do not treat the endometriosis.  Surgery  If medical therapy doesnt control the problem, surgery can be done. During surgery, endometrial implants may be removed. This may help women get pregnant if the endometriosis was causing fertility problems. If a woman does not want to get pregnant, in some cases, the uterus may be removed. This is called a hysterectomy. The ovaries may be removed along with the uterus. There are 2 techniques for doing surgery:    · Laparoscopy. This is surgery done through small incisions in your stomach. An instrument called a laparoscope (a thin, lighted tube) is used. It is put through 1 of the small incisions. Surgical tools are put  through the other small incisions.  · Laparotomy. This is surgery done through 1 larger incision in your stomach. It is used to remove large implants that cant be reached with the laparoscope. It may also be used when pelvic organs, such as your bowel, are involved.  For more information  To learn more, try the sources below:  · [employer].gov 345-781-5032  · Endometriosis Association [phone]  · [facility] 235-975-5802   Date Last Reviewed: 5/10/2015  © 7305-3143 [employer]. [address] [place] [address]. All rights reserved. This information is not intended as a substitute for professional medical care. Always follow your healthcare professional's instructions.

## 2017-05-30 NOTE — PROGRESS NOTES
Subjective:       Patient ID: Flavia Patricio is a 26 y.o. female.    Chief Complaint:  Post-op Evaluation      History of Present Illness  HPI  Presents for post-op check s/p diagnostic laparoscopy, left ovarian cystectomy, and endometriosis fulguration on 2017.   Operative findings: Upper abdomen appears normal.  Liver edge smooth and normal.  3.5cm cyst on the left ovary filled with chocolate appearing fluid consistent with endometrioma versus old hemorrhagic cyst.  Multiple powder burn lesions in the posterior cul de sac and bilateral uterosacral ligaments and on the right ovary.  Bilateral fallopian tubes appear normal    Pathology:  Left ovarian cyst wall:  Fragments of ovarian stroma with hemorrhagic cystic follicle.    Doing well since surgery.  Has been having daily light vaginal bleeding since then.  Her last depo provera injection was just prior to surgery.  Pelvic pain has improved.    GYN & OB History  No LMP recorded. Patient has had an injection.   Date of Last Pap: 11/15/2016    OB History    Para Term  AB Living   2 1 1  1 1   SAB TAB Ectopic Multiple Live Births   1    1      # Outcome Date GA Lbr Sung/2nd Weight Sex Delivery Anes PTL Lv   2 Term 06/10/15 40w0d  3.005 kg (6 lb 10 oz) M Vag-Spont   DANNY   1 SAB 12 5w0d                 Review of Systems  Review of Systems   Constitutional: Negative for fatigue, fever and unexpected weight change.   Gastrointestinal: Negative for abdominal pain, constipation, diarrhea, nausea and vomiting.   Genitourinary: Positive for vaginal bleeding. Negative for dysuria, frequency, urgency, vaginal discharge, vaginal pain and vaginal odor.           Objective:    Physical Exam:   Constitutional: She is oriented to person, place, and time. She appears well-developed and well-nourished. No distress.             Abdominal: Soft. She exhibits abdominal incision (trocar site incisions are well-healed and intact). She exhibits no distension  and no mass. There is no tenderness. There is no rebound and no guarding.             Musculoskeletal: She exhibits no edema.       Neurological: She is alert and oriented to person, place, and time.     Psychiatric: She has a normal mood and affect. Her behavior is normal. Judgment and thought content normal.          Assessment:        1. Status post laparoscopy    2. Endometriosis of pelvic peritoneum    3. Breakthrough bleeding on depo provera                Plan:      Flavia Carter was seen today for post-op evaluation.    Diagnoses and all orders for this visit:    Status post laparoscopy    Endometriosis of pelvic peritoneum    Breakthrough bleeding on depo provera  -     estrogens, conjugated, (PREMARIN) 1.25 MG tablet; Take 1 tablet (1.25 mg total) by mouth once daily.    Reviewed operative findings with the patient.  Discussed pathophysiology and clinical course of endometriosis.  Continue depo provera for now.  RTC 11/2017 for well-woman exam.

## 2017-07-24 ENCOUNTER — HOSPITAL ENCOUNTER (EMERGENCY)
Facility: HOSPITAL | Age: 27
Discharge: HOME | End: 2017-07-25
Attending: EMERGENCY MEDICINE
Payer: COMMERCIAL

## 2017-07-24 DIAGNOSIS — R10.9 ABDOMINAL PAIN, UNSPECIFIED LOCATION: Primary | ICD-10-CM

## 2017-07-24 PROCEDURE — 96361 HYDRATE IV INFUSION ADD-ON: CPT

## 2017-07-24 PROCEDURE — 96375 TX/PRO/DX INJ NEW DRUG ADDON: CPT

## 2017-07-24 PROCEDURE — 96374 THER/PROPH/DIAG INJ IV PUSH: CPT

## 2017-07-24 PROCEDURE — 99285 EMERGENCY DEPT VISIT HI MDM: CPT | Mod: 25

## 2017-07-25 VITALS
BODY MASS INDEX: 19.66 KG/M2 | WEIGHT: 118 LBS | HEIGHT: 65 IN | TEMPERATURE: 99 F | OXYGEN SATURATION: 100 % | HEART RATE: 78 BPM | RESPIRATION RATE: 18 BRPM | DIASTOLIC BLOOD PRESSURE: 78 MMHG | SYSTOLIC BLOOD PRESSURE: 114 MMHG

## 2017-07-25 LAB
ALBUMIN SERPL BCP-MCNC: 4 G/DL
ALP SERPL-CCNC: 51 U/L
ALT SERPL W/O P-5'-P-CCNC: 12 U/L
ANION GAP SERPL CALC-SCNC: 8 MMOL/L
AST SERPL-CCNC: 19 U/L
B-HCG UR QL: NEGATIVE
BASOPHILS # BLD AUTO: ABNORMAL K/UL
BASOPHILS NFR BLD: 0 %
BILIRUB SERPL-MCNC: 0.8 MG/DL
BILIRUB UR QL STRIP: NEGATIVE
BUN SERPL-MCNC: 12 MG/DL
CALCIUM SERPL-MCNC: 8.9 MG/DL
CHLORIDE SERPL-SCNC: 107 MMOL/L
CLARITY UR: CLEAR
CO2 SERPL-SCNC: 25 MMOL/L
COLOR UR: YELLOW
CREAT SERPL-MCNC: 1 MG/DL
DIFFERENTIAL METHOD: ABNORMAL
EOSINOPHIL # BLD AUTO: ABNORMAL K/UL
EOSINOPHIL NFR BLD: 1 %
ERYTHROCYTE [DISTWIDTH] IN BLOOD BY AUTOMATED COUNT: 13 %
EST. GFR  (AFRICAN AMERICAN): >60 ML/MIN/1.73 M^2
EST. GFR  (NON AFRICAN AMERICAN): >60 ML/MIN/1.73 M^2
GLUCOSE SERPL-MCNC: 80 MG/DL
GLUCOSE UR QL STRIP: NEGATIVE
HCT VFR BLD AUTO: 36.7 %
HGB BLD-MCNC: 12.1 G/DL
HGB UR QL STRIP: NEGATIVE
KETONES UR QL STRIP: ABNORMAL
LEUKOCYTE ESTERASE UR QL STRIP: NEGATIVE
LIPASE SERPL-CCNC: 27 U/L
LYMPHOCYTES # BLD AUTO: ABNORMAL K/UL
LYMPHOCYTES NFR BLD: 56 %
MCH RBC QN AUTO: 28.1 PG
MCHC RBC AUTO-ENTMCNC: 33 G/DL
MCV RBC AUTO: 85 FL
MONOCYTES # BLD AUTO: ABNORMAL K/UL
MONOCYTES NFR BLD: 4 %
NEUTROPHILS NFR BLD: 37 %
NEUTS BAND NFR BLD MANUAL: 2 %
NITRITE UR QL STRIP: NEGATIVE
PH UR STRIP: 6 [PH] (ref 5–8)
PLATELET # BLD AUTO: 238 K/UL
PMV BLD AUTO: 10.5 FL
POTASSIUM SERPL-SCNC: 3.4 MMOL/L
PROT SERPL-MCNC: 7.7 G/DL
PROT UR QL STRIP: NEGATIVE
RBC # BLD AUTO: 4.3 M/UL
SODIUM SERPL-SCNC: 140 MMOL/L
SP GR UR STRIP: 1.01 (ref 1–1.03)
URN SPEC COLLECT METH UR: ABNORMAL
UROBILINOGEN UR STRIP-ACNC: NEGATIVE EU/DL
WBC # BLD AUTO: 11.11 K/UL

## 2017-07-25 PROCEDURE — 81003 URINALYSIS AUTO W/O SCOPE: CPT

## 2017-07-25 PROCEDURE — 63600175 PHARM REV CODE 636 W HCPCS: Performed by: EMERGENCY MEDICINE

## 2017-07-25 PROCEDURE — 80053 COMPREHEN METABOLIC PANEL: CPT

## 2017-07-25 PROCEDURE — 85027 COMPLETE CBC AUTOMATED: CPT

## 2017-07-25 PROCEDURE — 83690 ASSAY OF LIPASE: CPT

## 2017-07-25 PROCEDURE — 81025 URINE PREGNANCY TEST: CPT

## 2017-07-25 PROCEDURE — 85007 BL SMEAR W/DIFF WBC COUNT: CPT

## 2017-07-25 PROCEDURE — 25500020 PHARM REV CODE 255: Performed by: EMERGENCY MEDICINE

## 2017-07-25 PROCEDURE — 25000003 PHARM REV CODE 250: Performed by: EMERGENCY MEDICINE

## 2017-07-25 RX ORDER — ONDANSETRON 2 MG/ML
4 INJECTION INTRAMUSCULAR; INTRAVENOUS
Status: COMPLETED | OUTPATIENT
Start: 2017-07-25 | End: 2017-07-25

## 2017-07-25 RX ORDER — KETOROLAC TROMETHAMINE 30 MG/ML
30 INJECTION, SOLUTION INTRAMUSCULAR; INTRAVENOUS
Status: COMPLETED | OUTPATIENT
Start: 2017-07-25 | End: 2017-07-25

## 2017-07-25 RX ADMIN — KETOROLAC TROMETHAMINE 30 MG: 30 INJECTION, SOLUTION INTRAMUSCULAR at 01:07

## 2017-07-25 RX ADMIN — ONDANSETRON 4 MG: 2 INJECTION INTRAMUSCULAR; INTRAVENOUS at 01:07

## 2017-07-25 RX ADMIN — SODIUM CHLORIDE 1000 ML: 0.9 INJECTION, SOLUTION INTRAVENOUS at 01:07

## 2017-07-25 RX ADMIN — IOHEXOL 75 ML: 350 INJECTION, SOLUTION INTRAVENOUS at 02:07

## 2017-07-25 NOTE — ED NOTES
Pt reports that abd pain started tonight around 10pm. Pt states she ate a hot and spicy sandwich earlier tonight. Pt states she has a hx of pancreatitis and endometriosis.

## 2017-07-25 NOTE — ED PROVIDER NOTES
SCRIBE #1 NOTE: I, Fernando Bingham, kristine scribing for, and in the presence of, Dion Reyes Jr., MD. I have scribed the HPI, ROS, and PEx.    SCRIBE #2 NOTE: I, Hue Stallworth, am scribing for, and in the presence of,  Emelia Bingham MD. I have scribed the remaining portions of the note not scribed by Scribe #1.     History      Chief Complaint   Patient presents with    Abdominal Pain     generalized abdominal pain. hx of pancreatitis and endometriosis       Review of patient's allergies indicates:  No Known Allergies     HPI   HPI    7/24/2017, 11:58 PM   History obtained from the patient      History of Present Illness: Flavia Patricio is a 27 y.o. female patient, with a PMHx of endometriosis, who presents to the Emergency Department for diffuse abdominal pain which onset gradually earlier today. Pt has a hx of pancreatitis and states that sx are similar. Pt states that sx onset 2 hours after eating a spicy chicken sandwich.  Symptoms are constant and moderate in severity. Patient took ibuprofen with mild mitigation. No exacerbating factors reported. Associated sxs include nausea. Patient denies any V/D, EtOH use, dysuria, hematuria, hematochezia, vaginal discharge,  and all other sxs at this time. No further complaints or concerns at this time.         Arrival mode: AAS    PCP: Primary Doctor No       Past Medical History:  Past Medical History:   Diagnosis Date    Anemia     Genital herpes complicating pregnancy in second trimester 2/16/2015    GERD (gastroesophageal reflux disease)     Insomnia     Miscarriage     Pancreatitis, acute     Pelvic inflammatory disease (PID)     Restless leg syndrome        Past Surgical History:  Past Surgical History:   Procedure Laterality Date    COLONOSCOPY W/ POLYPECTOMY      x 1    PELVIC LAPAROSCOPY  05/01/2017    WISDOM TOOTH EXTRACTION           Family History:  Family History   Problem Relation Age of Onset    Colon cancer Maternal Grandfather     Breast cancer  "Cousin     Heart attack Maternal Uncle     Heart attack Maternal Uncle     Heart attack Maternal Uncle     Heart attack Maternal Uncle     Heart attack Maternal Uncle        Social History:  Social History     Social History Main Topics    Smoking status: Light Tobacco Smoker    Smokeless tobacco: Unknown      Comment: pt states "i smoke marijuana twice a day"    Alcohol use Yes      Comment: rare    Drug use:      Frequency: 14.0 times per week     Types: Marijuana    Sexual activity: Yes     Partners: Male     Birth control/ protection: None       ROS   Review of Systems   Constitutional: Negative for chills, diaphoresis and fever.   HENT: Negative for sore throat.    Respiratory: Negative for shortness of breath.    Cardiovascular: Negative for chest pain.   Gastrointestinal: Positive for abdominal pain and nausea. Negative for blood in stool, constipation, diarrhea and vomiting.   Genitourinary: Negative for dysuria, hematuria, vaginal bleeding, vaginal discharge and vaginal pain.   Musculoskeletal: Negative for back pain.   Skin: Negative for rash.   Neurological: Negative for dizziness, weakness, light-headedness, numbness and headaches.   Hematological: Does not bruise/bleed easily.       Physical Exam      Initial Vitals [07/24/17 2346]   BP Pulse Resp Temp SpO2   124/79 75 18 98.5 °F (36.9 °C) 100 %      MAP       94          Physical Exam  Nursing Notes and Vital Signs Reviewed.  Constitutional: Patient is in mild distress. Well-developed and well-nourished.  Head: Atraumatic. Normocephalic.  Eyes: PERRL. EOM intact. Conjunctivae are not pale. No scleral icterus.  ENT: Mucous membranes are moist. Oropharynx is clear and symmetric.    Neck: Supple. Full ROM. No lymphadenopathy.  Cardiovascular: Regular rate. Regular rhythm. No murmurs, rubs, or gallops. Distal pulses are 2+ and symmetric.  Pulmonary/Chest: No respiratory distress. Clear to auscultation bilaterally. No wheezing, rales, or " "rhonchi.  Abdominal: Soft and non-distended. Generalized abd pain.  No rebound, guarding, or rigidity.   Back: No midline bony tenderness, deformities, or step-offs of the T-spine or L-spine.   Musculoskeletal: Moves all extremities. No obvious deformities. No edema. No calf tenderness.  Skin: Warm and dry.  Neurological:  Alert, awake, and appropriate.  Normal speech.  No acute focal neurological deficits are appreciated.  Psychiatric: Normal affect. Good eye contact. Appropriate in content.    ED Course    Procedures  ED Vital Signs:  Vitals:    07/24/17 2346 07/25/17 0233 07/25/17 0415   BP: 124/79 116/82 114/78   Pulse: 75  78   Resp: 18  18   Temp: 98.5 °F (36.9 °C)     TempSrc: Oral     SpO2: 100% 100% 100%   Weight: 53.5 kg (118 lb)     Height: 5' 5" (1.651 m)         Abnormal Lab Results:  Labs Reviewed   CBC W/ AUTO DIFFERENTIAL - Abnormal; Notable for the following:        Result Value    Hematocrit 36.7 (*)     Gran% 37.0 (*)     Lymph% 56.0 (*)     All other components within normal limits   COMPREHENSIVE METABOLIC PANEL - Abnormal; Notable for the following:     Potassium 3.4 (*)     Alkaline Phosphatase 51 (*)     All other components within normal limits   URINALYSIS - Abnormal; Notable for the following:     Ketones, UA Trace (*)     All other components within normal limits   LIPASE   PREGNANCY TEST, URINE RAPID        All Lab Results:  Results for orders placed or performed during the hospital encounter of 07/24/17   CBC W/ AUTO DIFFERENTIAL   Result Value Ref Range    WBC 11.11 3.90 - 12.70 K/uL    RBC 4.30 4.00 - 5.40 M/uL    Hemoglobin 12.1 12.0 - 16.0 g/dL    Hematocrit 36.7 (L) 37.0 - 48.5 %    MCV 85 82 - 98 fL    MCH 28.1 27.0 - 31.0 pg    MCHC 33.0 32.0 - 36.0 g/dL    RDW 13.0 11.5 - 14.5 %    Platelets 238 150 - 350 K/uL    MPV 10.5 9.2 - 12.9 fL    Lymph # CANCELED 1.0 - 4.8 K/uL    Mono # CANCELED 0.3 - 1.0 K/uL    Eos # CANCELED 0.0 - 0.5 K/uL    Baso # CANCELED 0.00 - 0.20 K/uL    " Gran% 37.0 (L) 38.0 - 73.0 %    Lymph% 56.0 (H) 18.0 - 48.0 %    Mono% 4.0 4.0 - 15.0 %    Eosinophil% 1.0 0.0 - 8.0 %    Basophil% 0.0 0.0 - 1.9 %    Bands 2.0 %    Differential Method Manual    Comp. Metabolic Panel   Result Value Ref Range    Sodium 140 136 - 145 mmol/L    Potassium 3.4 (L) 3.5 - 5.1 mmol/L    Chloride 107 95 - 110 mmol/L    CO2 25 23 - 29 mmol/L    Glucose 80 70 - 110 mg/dL    BUN, Bld 12 6 - 20 mg/dL    Creatinine 1.0 0.5 - 1.4 mg/dL    Calcium 8.9 8.7 - 10.5 mg/dL    Total Protein 7.7 6.0 - 8.4 g/dL    Albumin 4.0 3.5 - 5.2 g/dL    Total Bilirubin 0.8 0.1 - 1.0 mg/dL    Alkaline Phosphatase 51 (L) 55 - 135 U/L    AST 19 10 - 40 U/L    ALT 12 10 - 44 U/L    Anion Gap 8 8 - 16 mmol/L    eGFR if African American >60 >60 mL/min/1.73 m^2    eGFR if non African American >60 >60 mL/min/1.73 m^2   Lipase   Result Value Ref Range    Lipase 27 4 - 60 U/L   Urinalysis - Clean Catch   Result Value Ref Range    Specimen UA Urine, Clean Catch     Color, UA Yellow Yellow, Straw, Daiana    Appearance, UA Clear Clear    pH, UA 6.0 5.0 - 8.0    Specific Gravity, UA 1.015 1.005 - 1.030    Protein, UA Negative Negative    Glucose, UA Negative Negative    Ketones, UA Trace (A) Negative    Bilirubin (UA) Negative Negative    Occult Blood UA Negative Negative    Nitrite, UA Negative Negative    Urobilinogen, UA Negative <2.0 EU/dL    Leukocytes, UA Negative Negative   Pregnancy, urine rapid   Result Value Ref Range    Preg Test, Ur Negative          Imaging Results:  Imaging Results          CT Abdomen Pelvis With Contrast (Final result)  Result time 07/25/17 09:49:40    Final result by Colin Monique III, MD (07/25/17 09:49:40)                 Impression:        1. No acute abdominal or pelvic abnormality suggested. No definite evidence of renal obstruction. No gross evidence to suggest acute appendicitis although detail is limited without oral contrast. Suspect small right ovarian cyst. No new significant  findings. Consider followup studies as indicated.      All CT scans at this facility use dose modulation, iterative reconstruction, and/or weight based dosing when appropriate to reduce radiation dose to as low as reasonably achievable.      Electronically signed by: JIHAN VAZQUEZ MD  Date:     07/25/17  Time:    09:49              Narrative:    CT of the abdomen and pelvis without contrast.    Clinical indication: Abdominal pain.    The lung bases are clear. There is no free intraperitoneal air. No bowel obstruction. No acute bony abnormality is suggested.    The liver and spleen show no focal abnormality. There is no adrenal or pancreatic mass or enlargement. There is no solid renal mass or obstruction. Lack of oral contrast limits detail.    There is a moderate volume of stool in the colon. No abnormality in the right lower quadrant that would suggest acute appendicitis. Suspect a right ovarian cyst measuring approximately 2.5 cm in diameter. This can be correlated with ultrasound if indicated. Minimal free fluid in the pelvis which may be physiologic. Borderline thickening of the bladder wall presumably due to under distention.                             Per [facility], pt's CT results   1. Right ovarian 2.4cm cyst.  2. Trace amount of free fluid which may be physiologic.  3. Bladder wall thickening possibly from under distention versus cystitis.         The Emergency Provider reviewed the vital signs and test results, which are outlined above.    ED Discussion     3:09 AM: Dr. Reyes transfers care of pt to Dr. Bingham, pending labs and CT results.    3:52 AM: Dr. Bingham assessed pt at this time. Discussed with pt all pertinent ED information and results. Discussed pt dx of abdominal pain and plan of tx. Gave pt all f/u and return to the ED instructions. All questions and concerns were addressed at this time. Pt expresses understanding of information and instructions, and is comfortable with plan to  discharge. Pt is stable for discharge.    I discussed with patient and/or family/caretaker that evaluation in the ED does not suggest any emergent or life threatening medical conditions requiring immediate intervention beyond what was provided in the ED, and I believe patient is safe for discharge.  Regardless, an unremarkable evaluation in the ED does not preclude the development or presence of a serious of life threatening condition. As such, patient was instructed to return immediately for any worsening or change in current symptoms.      ED Medication(s):  Medications   sodium chloride 0.9% bolus 1,000 mL (0 mLs Intravenous Stopped 7/25/17 0313)   ondansetron injection 4 mg (4 mg Intravenous Given 7/25/17 0111)   ketorolac injection 30 mg (30 mg Intravenous Given 7/25/17 0111)   omnipaque 350 iohexol 75 mL (75 mLs Intravenous Given 7/25/17 0203)       Discharge Medication List as of 7/25/2017  3:47 AM          Follow-up Information     your doctor In 2 days.           [facility]    Specialty:  Emergency Medicine  Why:  As needed, If symptoms worsen  Contact information:  [address] [place] [id number] [phone]                   Medical Decision Making    Medical Decision Making:   Clinical Tests:   Lab Tests: Ordered and Reviewed  Radiological Study: Ordered and Reviewed           Scribe Attestation:   Scribe #1: I performed the above scribed service and the documentation accurately describes the services I performed. I attest to the accuracy of the note.    Attending:   Physician Attestation Statement for Scribe #1: I, Dion Reyes Jr., MD, personally performed the services described in this documentation, as scribed by Fernando Bingham, in my presence, and it is both accurate and complete.       Scribe Attestation:   Scribe #2: I performed the above scribed service and the documentation accurately describes the services I performed. I attest to the accuracy of  the note.    Attending Attestation:           Physician Attestation for Scribe:    Physician Attestation Statement for Scribe #2: I, Emelia Bingham MD, reviewed documentation, as scribed by Hue Stallworth in my presence, and it is both accurate and complete. I also acknowledge and confirm the content of the note done by Scribe #1.          Clinical Impression       ICD-10-CM ICD-9-CM   1. Abdominal pain, unspecified location R10.9 789.00       Disposition:   Disposition: Discharged  Condition: Stable         Emelia Bingham MD  08/02/17 0557

## 2017-08-31 ENCOUNTER — TELEPHONE (OUTPATIENT)
Dept: OBSTETRICS AND GYNECOLOGY | Facility: CLINIC | Age: 27
End: 2017-08-31

## 2017-08-31 DIAGNOSIS — N91.2 AMENORRHEA: Primary | ICD-10-CM

## 2017-08-31 NOTE — TELEPHONE ENCOUNTER
----- Message from Page Maurer sent at 8/31/2017 12:55 PM CDT -----  Contact: pt   Call pt regarding getting a nurse visit for a Depo injection.      ..[phone] (dhsv)

## 2017-08-31 NOTE — TELEPHONE ENCOUNTER
Spoke with pt, states that she would like to come in for her depo provera injection. Last injection due was 07/19/2017, please advise if pt needs to do hcg, or UPT before can receive the depo provera.

## 2017-09-01 NOTE — TELEPHONE ENCOUNTER
Notified pt that she will need to have blood work for hcg before she can receive her depo provera. Pt verbalized understanding, pt scheduled 9/5 for lab and 9/6 for depo provera.

## 2017-09-05 ENCOUNTER — LAB VISIT (OUTPATIENT)
Dept: LAB | Facility: HOSPITAL | Age: 27
End: 2017-09-05
Attending: OBSTETRICS & GYNECOLOGY
Payer: COMMERCIAL

## 2017-09-05 DIAGNOSIS — N91.2 AMENORRHEA: ICD-10-CM

## 2017-09-05 LAB — HCG INTACT+B SERPL-ACNC: <1.2 MIU/ML

## 2017-09-05 PROCEDURE — 84702 CHORIONIC GONADOTROPIN TEST: CPT

## 2017-09-05 PROCEDURE — 36415 COLL VENOUS BLD VENIPUNCTURE: CPT

## 2017-09-06 ENCOUNTER — CLINICAL SUPPORT (OUTPATIENT)
Dept: OBSTETRICS AND GYNECOLOGY | Facility: CLINIC | Age: 27
End: 2017-09-06
Payer: COMMERCIAL

## 2017-09-06 PROCEDURE — 96372 THER/PROPH/DIAG INJ SC/IM: CPT | Mod: S$GLB,,, | Performed by: OBSTETRICS & GYNECOLOGY

## 2017-09-06 RX ADMIN — MEDROXYPROGESTERONE ACETATE 150 MG: 150 INJECTION, SUSPENSION INTRAMUSCULAR at 10:09

## 2017-09-06 NOTE — PROGRESS NOTES
Two pt identifiers verified  pt notified to wait in clinic for 15 minutes after receiving injection, pt verbalized understanding  150mg/mL of Depo Provera administered IM to pt's left ventrogluteal.   Pt tolerated well  Next injection scheduled for 11/29/2017 at 1000.

## 2017-11-21 ENCOUNTER — TELEPHONE (OUTPATIENT)
Dept: OBSTETRICS AND GYNECOLOGY | Facility: CLINIC | Age: 27
End: 2017-11-21

## 2017-11-21 NOTE — TELEPHONE ENCOUNTER
Patient still bleeding on depo after 2 week,  Her next depo shot is 11/29/2017.  Do you want to see her.

## 2017-11-21 NOTE — TELEPHONE ENCOUNTER
----- Message from Karli Horta sent at 11/21/2017  1:47 PM CST -----  states that she has not stopped bleeding from last shot, pls adv..[phone] (home)

## 2017-11-29 ENCOUNTER — CLINICAL SUPPORT (OUTPATIENT)
Dept: OBSTETRICS AND GYNECOLOGY | Facility: CLINIC | Age: 27
End: 2017-11-29
Payer: COMMERCIAL

## 2017-11-29 DIAGNOSIS — Z30.9 ENCOUNTER FOR CONTRACEPTIVE MANAGEMENT, UNSPECIFIED TYPE: Primary | ICD-10-CM

## 2017-11-29 PROCEDURE — 99999 PR PBB SHADOW E&M-EST. PATIENT-LVL I: CPT | Mod: PBBFAC,,,

## 2017-11-29 PROCEDURE — 96372 THER/PROPH/DIAG INJ SC/IM: CPT | Mod: S$GLB,,, | Performed by: OBSTETRICS & GYNECOLOGY

## 2017-11-29 RX ORDER — MEDROXYPROGESTERONE ACETATE 150 MG/ML
150 INJECTION, SUSPENSION INTRAMUSCULAR ONCE
Status: COMPLETED | OUTPATIENT
Start: 2017-11-29 | End: 2017-11-29

## 2017-11-29 RX ADMIN — MEDROXYPROGESTERONE ACETATE 150 MG: 150 INJECTION, SUSPENSION INTRAMUSCULAR at 11:11

## 2017-12-28 ENCOUNTER — TELEPHONE (OUTPATIENT)
Dept: OBSTETRICS AND GYNECOLOGY | Facility: CLINIC | Age: 27
End: 2017-12-28

## 2017-12-28 NOTE — TELEPHONE ENCOUNTER
I spoke with pt she was in route to her appointment, but Dr. Dutton was called out to the hospital for a .  Pt rescheduled appointment to 18 at 3:30p with Dr. DARREN Dutton at [facility].  She voiced understanding

## 2017-12-28 NOTE — TELEPHONE ENCOUNTER
----- Message from Rosa Campbell sent at 12/28/2017  2:58 PM CST -----  Contact: self   Patient running about 15 minutes late.Estimated time of arrival 3:15 pm.Please call back at [phone].        Thanks,  Rosa Campbell

## 2018-01-03 ENCOUNTER — OFFICE VISIT (OUTPATIENT)
Dept: OBSTETRICS AND GYNECOLOGY | Facility: CLINIC | Age: 28
End: 2018-01-03
Payer: COMMERCIAL

## 2018-01-03 VITALS
DIASTOLIC BLOOD PRESSURE: 76 MMHG | BODY MASS INDEX: 20.71 KG/M2 | HEIGHT: 65 IN | SYSTOLIC BLOOD PRESSURE: 114 MMHG | WEIGHT: 124.31 LBS

## 2018-01-03 DIAGNOSIS — N92.1 BREAKTHROUGH BLEEDING ON DEPO PROVERA: ICD-10-CM

## 2018-01-03 DIAGNOSIS — N92.1 BREAKTHROUGH BLEEDING ON DEPO-PROVERA: Primary | ICD-10-CM

## 2018-01-03 PROCEDURE — 99999 PR PBB SHADOW E&M-EST. PATIENT-LVL II: CPT | Mod: PBBFAC,,, | Performed by: OBSTETRICS & GYNECOLOGY

## 2018-01-03 PROCEDURE — 99213 OFFICE O/P EST LOW 20 MIN: CPT | Mod: S$GLB,,, | Performed by: OBSTETRICS & GYNECOLOGY

## 2018-01-03 PROCEDURE — 87491 CHLMYD TRACH DNA AMP PROBE: CPT

## 2018-01-03 RX ORDER — DICYCLOMINE HYDROCHLORIDE 10 MG/1
CAPSULE ORAL
Refills: 0 | COMMUNITY
Start: 2017-10-04 | End: 2018-05-30

## 2018-01-03 NOTE — PROGRESS NOTES
Subjective:       Patient ID: Flavia Patricio is a 27 y.o. female.    Chief Complaint:  Vaginal Bleeding      History of Present Illness  HPI  On DepoProvera over one year  Recurring problem with breakthrough bleeding   On time with injections, less than 2 months since last injection   No pelvic pain   Recent Laparoscopy for endometriosis and ovarian cyst.       GYN & OB History  No LMP recorded. Patient has had an injection.   Date of Last Pap: 11/15/2016    OB History    Para Term  AB Living   2 1 1   1 1   SAB TAB Ectopic Multiple Live Births   1       1      # Outcome Date GA Lbr Sung/2nd Weight Sex Delivery Anes PTL Lv   2 Term 06/10/15 40w0d  3.005 kg (6 lb 10 oz) M Vag-Spont   DANNY   1 SAB 12 5w0d                 Review of Systems  Review of Systems        Objective:    Physical Exam:             Abdominal: Soft. Bowel sounds are normal. She exhibits no distension, no mass and no abdominal incision. There is no tenderness. There is no guarding. No hernia. Hernia confirmed negative in the right inguinal area and confirmed negative in the left inguinal area.     Genitourinary: Rectum normal, vagina normal and uterus normal. There is no rash, tenderness or lesion on the right labia. There is no rash, tenderness or lesion on the left labia. Uterus is not deviated, not enlarged, not tender, not hosting fibroids and not experiencing uterine prolapse. Cervix is normal. Right adnexum displays no mass, no tenderness and no fullness. Left adnexum displays no mass, no tenderness and no fullness. No tenderness, bleeding, rectocele, cystocele or unspecified prolapse of vaginal walls in the vagina. No foreign body in the vagina. No signs of injury around the vagina. No vaginal discharge found. Cervix exhibits no motion tenderness, no discharge and no friability.   Genitourinary Comments: DNA probe done of the cervix                 Lymphadenopathy:        Right: No inguinal adenopathy present.         Left: No inguinal adenopathy present.             Assessment:        1. Breakthrough bleeding on Depo-Provera    2. Breakthrough bleeding on depo provera                Plan:      Flavia Carter was seen today for vaginal bleeding.    Diagnoses and all orders for this visit:    Breakthrough bleeding on Depo-Provera  -     C. trachomatis/N. gonorrhoeae by AMP DNA Cervix  -     estrogens, conjugated, (PREMARIN) 1.25 MG tablet; Take 1 tablet (1.25 mg total) by mouth once daily.    Breakthrough bleeding on depo provera  -     C. trachomatis/N. gonorrhoeae by AMP DNA Cervix  -     estrogens, conjugated, (PREMARIN) 1.25 MG tablet; Take 1 tablet (1.25 mg total) by mouth once daily.

## 2018-01-04 LAB
C TRACH DNA SPEC QL NAA+PROBE: NOT DETECTED
N GONORRHOEA DNA SPEC QL NAA+PROBE: NOT DETECTED

## 2018-02-15 ENCOUNTER — OFFICE VISIT (OUTPATIENT)
Dept: OBSTETRICS AND GYNECOLOGY | Facility: CLINIC | Age: 28
End: 2018-02-15
Payer: COMMERCIAL

## 2018-02-15 VITALS
DIASTOLIC BLOOD PRESSURE: 70 MMHG | SYSTOLIC BLOOD PRESSURE: 104 MMHG | WEIGHT: 120.13 LBS | BODY MASS INDEX: 20.01 KG/M2 | HEIGHT: 65 IN

## 2018-02-15 DIAGNOSIS — Z01.419 ENCOUNTER FOR GYNECOLOGICAL EXAMINATION WITHOUT ABNORMAL FINDING: Primary | ICD-10-CM

## 2018-02-15 DIAGNOSIS — Z30.42 ENCOUNTER FOR DEPO-PROVERA CONTRACEPTION: ICD-10-CM

## 2018-02-15 DIAGNOSIS — N92.1 BREAKTHROUGH BLEEDING ON DEPO-PROVERA: ICD-10-CM

## 2018-02-15 PROCEDURE — 96372 THER/PROPH/DIAG INJ SC/IM: CPT | Mod: S$GLB,,, | Performed by: OBSTETRICS & GYNECOLOGY

## 2018-02-15 PROCEDURE — 99999 PR PBB SHADOW E&M-EST. PATIENT-LVL II: CPT | Mod: PBBFAC,,, | Performed by: NURSE PRACTITIONER

## 2018-02-15 PROCEDURE — 88141 CYTOPATH C/V INTERPRET: CPT | Mod: ,,, | Performed by: PATHOLOGY

## 2018-02-15 PROCEDURE — 88175 CYTOPATH C/V AUTO FLUID REDO: CPT | Performed by: PATHOLOGY

## 2018-02-15 PROCEDURE — 99395 PREV VISIT EST AGE 18-39: CPT | Mod: 25,S$GLB,, | Performed by: NURSE PRACTITIONER

## 2018-02-15 RX ORDER — ESTRADIOL 2 MG/1
2 TABLET ORAL DAILY
Qty: 30 TABLET | Refills: 3 | Status: SHIPPED | OUTPATIENT
Start: 2018-02-15 | End: 2019-02-15

## 2018-02-15 RX ORDER — MEDROXYPROGESTERONE ACETATE 150 MG/ML
150 INJECTION, SUSPENSION INTRAMUSCULAR
Status: DISCONTINUED | OUTPATIENT
Start: 2018-02-15 | End: 2018-08-10

## 2018-02-15 RX ADMIN — MEDROXYPROGESTERONE ACETATE 150 MG: 150 INJECTION, SUSPENSION INTRAMUSCULAR at 01:02

## 2018-02-15 NOTE — PROGRESS NOTES
"CC: Well woman exam    Flavia Patricio is a 27 y.o. female  presents for well woman exam.  LMP: No LMP recorded (lmp unknown). Patient has had an injection..    Still with btb on depo provera - had seen Dr. Dutton in early January and was given premarin to take but was too expensive so never took it or reached out to him concerning this.   Genprobe was done at that visit and it was normal.     Past Medical History:   Diagnosis Date    Anemia     Genital herpes complicating pregnancy in second trimester 2015    GERD (gastroesophageal reflux disease)     Insomnia     Miscarriage     Pancreatitis, acute     Pelvic inflammatory disease (PID)     Restless leg syndrome      Past Surgical History:   Procedure Laterality Date    COLONOSCOPY W/ POLYPECTOMY      x 1    PELVIC LAPAROSCOPY  2017    WISDOM TOOTH EXTRACTION       Social History     Social History    Marital status: Single     Spouse name: N/A    Number of children: N/A    Years of education: N/A     Occupational History    manager       Social History Main Topics    Smoking status: Light Tobacco Smoker    Smokeless tobacco: Never Used      Comment: pt states "i smoke marijuana twice a day"    Alcohol use Yes      Comment: rare    Drug use: Yes     Frequency: 14.0 times per week     Types: Marijuana    Sexual activity: Yes     Partners: Male     Birth control/ protection: None     Other Topics Concern    Not on file     Social History Narrative    No narrative on file     Family History   Problem Relation Age of Onset    Colon cancer Maternal Grandfather     Heart attack Maternal Grandfather     Breast cancer Cousin     Heart attack Maternal Uncle     Heart attack Maternal Uncle     Heart attack Maternal Uncle     Heart attack Maternal Uncle     Heart attack Maternal Uncle     Heart attack Paternal Grandfather     Heart attack Paternal Grandmother      OB History      Para Term  AB Living    " "2 1 1   1 1    SAB TAB Ectopic Multiple Live Births    1       1          /70   Ht 5' 5" (1.651 m)   Wt 54.5 kg (120 lb 2.4 oz)   LMP  (LMP Unknown)   BMI 19.99 kg/m²       ROS:  GENERAL: Denies weight gain or weight loss. Feeling well overall.   SKIN: Denies rash or lesions.   HEAD: Denies head injury or headache.   NODES: Denies enlarged lymph nodes.   CHEST: Denies chest pain or shortness of breath.   CARDIOVASCULAR: Denies palpitations or left sided chest pain.   ABDOMEN: No abdominal pain, constipation, diarrhea, nausea, vomiting or rectal bleeding.   URINARY: No frequency, dysuria, hematuria, or burning on urination.  REPRODUCTIVE: See HPI.   BREASTS: The patient performs breast self-examination and denies pain, lumps, or nipple discharge.   HEMATOLOGIC: No easy bruisability or excessive bleeding.   MUSCULOSKELETAL: Denies joint pain or swelling.   NEUROLOGIC: Denies syncope or weakness.   PSYCHIATRIC: Denies depression, anxiety or mood swings.    PHYSICAL EXAM:  APPEARANCE: Well nourished, well developed, in no acute distress.  AFFECT: WNL, alert and oriented x 3  SKIN: No acne or hirsutism  NECK: Neck symmetric without masses or thyromegaly  NODES: No inguinal, cervical, axillary, or femoral lymph node enlargement  CHEST: Good respiratory effect  ABDOMEN: Soft.  No tenderness or masses.  No hepatosplenomegaly.  No hernias.  BREASTS: Symmetrical, no skin changes or visible lesions.  No palpable masses, nipple discharge bilaterally.  PELVIC: Normal external genitalia without lesions.  Normal hair distribution.  Adequate perineal body, normal urethral meatus.  Vagina moist and well rugated without lesions or discharge.  Cervix pink, without lesions, discharge or tenderness.  No significant cystocele or rectocele.  Bimanual exam shows uterus to be normal size, regular, mobile and nontender.  Adnexa without masses or tenderness.    EXTREMITIES: No edema.  Physical Exam    1. Encounter for gynecological " examination without abnormal finding  Liquid-based pap smear, screening   2. Breakthrough bleeding on Depo-Provera  estradiol (ESTRACE) 2 MG tablet   3. Encounter for Depo-Provera contraception  medroxyPROGESTERone (DEPO-PROVERA) syringe 150 mg    AND PLAN:    Patient was counseled today on A.C.S. Pap guidelines and recommendations for yearly pelvic exams, mammograms and monthly self breast exams; to see her PCP for other health maintenance.     No bleeding today - pt states is very sporadic and light  Will be getting depo provera today   Sent in for estrace -told her to try over next month and see how does with spotting

## 2018-02-15 NOTE — PROGRESS NOTES
Depo Provera 150 mg given IM right ventrogluteal. Order per Munira Sanchez on 2/15/18. Advised pt. To remain 15 min. After injection. No complications. Next injection due between 5/3/18 and 5/17/18. Next injection scheduled for 5/3/18.

## 2018-02-21 ENCOUNTER — TELEPHONE (OUTPATIENT)
Dept: OBSTETRICS AND GYNECOLOGY | Facility: CLINIC | Age: 28
End: 2018-02-21

## 2018-04-18 DIAGNOSIS — D50.8 IRON DEFICIENCY ANEMIA SECONDARY TO INADEQUATE DIETARY IRON INTAKE: ICD-10-CM

## 2018-04-18 NOTE — TELEPHONE ENCOUNTER
Pt's last seen by you 1/3/18. Pt requesting refill on fergon. Medication pended. Pharmacy up to date. Please advise.

## 2018-04-23 ENCOUNTER — TELEPHONE (OUTPATIENT)
Dept: DERMATOLOGY | Facility: CLINIC | Age: 28
End: 2018-04-23

## 2018-04-23 NOTE — TELEPHONE ENCOUNTER
----- Message from Jerri Nguyễn sent at 4/23/2018 11:17 AM CDT -----  Contact: self [phone]  States that she would like to speak to nurse regarding her repeat pap. Please call back at [phone]//thank you acc

## 2018-05-03 ENCOUNTER — CLINICAL SUPPORT (OUTPATIENT)
Dept: OBSTETRICS AND GYNECOLOGY | Facility: CLINIC | Age: 28
End: 2018-05-03
Payer: COMMERCIAL

## 2018-05-03 PROCEDURE — 96372 THER/PROPH/DIAG INJ SC/IM: CPT | Mod: S$GLB,,, | Performed by: NURSE PRACTITIONER

## 2018-05-03 PROCEDURE — 99999 PR PBB SHADOW E&M-EST. PATIENT-LVL I: CPT | Mod: PBBFAC,,,

## 2018-05-03 RX ADMIN — MEDROXYPROGESTERONE ACETATE 150 MG: 150 INJECTION, SUSPENSION INTRAMUSCULAR at 10:05

## 2018-05-25 RX ORDER — PROMETHAZINE HYDROCHLORIDE 12.5 MG/1
TABLET ORAL
Qty: 30 TABLET | Refills: 0 | OUTPATIENT
Start: 2018-05-25

## 2018-05-30 ENCOUNTER — HOSPITAL ENCOUNTER (EMERGENCY)
Facility: HOSPITAL | Age: 28
Discharge: HOME | End: 2018-05-30
Attending: EMERGENCY MEDICINE
Payer: COMMERCIAL

## 2018-05-30 VITALS
HEART RATE: 103 BPM | HEIGHT: 67 IN | DIASTOLIC BLOOD PRESSURE: 67 MMHG | WEIGHT: 115.06 LBS | SYSTOLIC BLOOD PRESSURE: 116 MMHG | OXYGEN SATURATION: 99 % | BODY MASS INDEX: 18.06 KG/M2 | TEMPERATURE: 98 F | RESPIRATION RATE: 20 BRPM

## 2018-05-30 DIAGNOSIS — L02.416 ABSCESS OF LEFT THIGH: Primary | ICD-10-CM

## 2018-05-30 PROCEDURE — 99283 EMERGENCY DEPT VISIT LOW MDM: CPT | Mod: 25

## 2018-05-30 PROCEDURE — 10060 I&D ABSCESS SIMPLE/SINGLE: CPT

## 2018-05-30 PROCEDURE — 25000003 PHARM REV CODE 250: Performed by: EMERGENCY MEDICINE

## 2018-05-30 RX ORDER — LIDOCAINE HYDROCHLORIDE 10 MG/ML
5 INJECTION, SOLUTION EPIDURAL; INFILTRATION; INTRACAUDAL; PERINEURAL
Status: COMPLETED | OUTPATIENT
Start: 2018-05-30 | End: 2018-05-30

## 2018-05-30 RX ORDER — SULFAMETHOXAZOLE AND TRIMETHOPRIM 800; 160 MG/1; MG/1
2 TABLET ORAL 2 TIMES DAILY
Qty: 28 TABLET | Refills: 0 | Status: SHIPPED | OUTPATIENT
Start: 2018-05-30 | End: 2018-06-06

## 2018-05-30 RX ORDER — NAPROXEN 375 MG/1
375 TABLET ORAL 2 TIMES DAILY WITH MEALS
Qty: 30 TABLET | Refills: 0 | Status: SHIPPED | OUTPATIENT
Start: 2018-05-30 | End: 2018-08-10 | Stop reason: SDUPTHER

## 2018-05-30 RX ADMIN — LIDOCAINE HYDROCHLORIDE 50 MG: 10 INJECTION, SOLUTION EPIDURAL; INFILTRATION; INTRACAUDAL; PERINEURAL at 09:05

## 2018-05-30 NOTE — ED PROVIDER NOTES
"SCRIBE #1 NOTE: I, Corinne Mack, am scribing for, and in the presence of, Fran Winston MD. I have scribed the entire note.      History      Chief Complaint   Patient presents with    Abscess     Pt states I have an abscess "to the side of my stuff." x2 days. No drainage noted. (left side of vagina where thigh meets). -fever       Review of patient's allergies indicates:  No Known Allergies     HPI   HPI    5/30/2018, 9:17 AM   History obtained from the patient      History of Present Illness: Flavia Patricio is a 27 y.o. female patient who presents to the Emergency Department for an abscess to the inner L thigh which onset gradually 2 days ago. Symptoms are constant and moderate in severity. No mitigating or exacerbating factors reported. No associated sxs reported. Patient denies any fever, chills, fatigue, wekaness, numbness, HA, and all other sxs at this time. No prior Tx reported. No further complaints or concerns at this time.         Arrival mode: Personal vehicle    PCP: Primary Doctor No       Past Medical History:  Past Medical History:   Diagnosis Date    Anemia     Genital herpes complicating pregnancy in second trimester 2/16/2015    GERD (gastroesophageal reflux disease)     Insomnia     Miscarriage     Pancreatitis, acute     Pelvic inflammatory disease (PID)     Restless leg syndrome        Past Surgical History:  Past Surgical History:   Procedure Laterality Date    COLONOSCOPY W/ POLYPECTOMY      x 1    PELVIC LAPAROSCOPY  05/01/2017    WISDOM TOOTH EXTRACTION           Family History:  Family History   Problem Relation Age of Onset    Colon cancer Maternal Grandfather     Heart attack Maternal Grandfather     Breast cancer Cousin     Heart attack Maternal Uncle     Heart attack Maternal Uncle     Heart attack Maternal Uncle     Heart attack Maternal Uncle     Heart attack Maternal Uncle     Heart attack Paternal Grandfather     Heart attack Paternal Grandmother  " "      Social History:  Social History     Social History Main Topics    Smoking status: Light Tobacco Smoker    Smokeless tobacco: Never Used      Comment: pt states "i smoke marijuana twice a day"    Alcohol use Yes      Comment: rare    Drug use: Yes     Frequency: 14.0 times per week     Types: Marijuana    Sexual activity: Yes     Partners: Male     Birth control/ protection: None       ROS   Review of Systems   Constitutional: Negative for chills, diaphoresis, fatigue and fever.   Respiratory: Negative for cough and shortness of breath.    Cardiovascular: Negative for chest pain and leg swelling.   Gastrointestinal: Negative for abdominal pain, diarrhea, nausea and vomiting.   Musculoskeletal: Negative for back pain, neck pain and neck stiffness.   Skin: Negative for rash and wound.        (+) abscess   Neurological: Negative for dizziness, light-headedness, numbness and headaches.   All other systems reviewed and are negative.    Physical Exam      Initial Vitals [05/30/18 0906]   BP Pulse Resp Temp SpO2   116/67 103 20 98.2 °F (36.8 °C) 99 %      MAP       83.33          Physical Exam  Nursing Notes and Vital Signs Reviewed.  Constitutional: Patient is in no apparent distress. Well-developed and well-nourished.  Head: Atraumatic. Normocephalic.  Eyes: PERRL. EOM intact. Conjunctivae are not pale. No scleral icterus.  ENT: Mucous membranes are moist. Oropharynx is clear and symmetric.    Neck: Supple. Full ROM. No lymphadenopathy.  Cardiovascular: Regular rate. Regular rhythm. No murmurs, rubs, or gallops. Distal pulses are 2+ and symmetric.  Pulmonary/Chest: No respiratory distress. Clear to auscultation bilaterally. No wheezing or rales.  Abdominal: Soft and non-distended.  There is no tenderness.  No rebound, guarding, or rigidity.   Musculoskeletal: Moves all extremities. No obvious deformities. No edema.  Skin: Warm and dry. 1.5 cm area of erythema and fluctuance to L inner thigh.  Neurological:  " "Alert, awake, and appropriate.  Normal speech.  No acute focal neurological deficits are appreciated.  Psychiatric: Normal affect. Good eye contact. Appropriate in content.    ED Course    I & D - Incision and Drainage  Date/Time: 5/30/2018 9:39 AM  Performed by: CHARO COLLINS  Authorized by: CHARO COLLINS   Consent Done: Yes  Consent: Verbal consent obtained.  Risks and benefits: risks, benefits and alternatives were discussed  Consent given by: patient  Patient understanding: patient states understanding of the procedure being performed  Patient consent: the patient's understanding of the procedure matches consent given  Type: abscess  Body area: lower extremity  Location details: left leg  Anesthesia: local infiltration    Anesthesia:  Local Anesthetic: lidocaine 1% without epinephrine  Patient sedated: no  Scalpel size: 11  Incision type: single straight  Complexity: simple  Drainage: pus,  bloody and  purulent  Drainage amount: copious  Wound treatment: incision,  drainage,  expression of material and  wound packed  Packing material: 1/4 in gauze  Complications: No  Specimens: No  Implants: No  Patient tolerance: Patient tolerated the procedure well with no immediate complications        ED Vital Signs:  Vitals:    05/30/18 0906   BP: 116/67   Pulse: 103   Resp: 20   Temp: 98.2 °F (36.8 °C)   TempSrc: Oral   SpO2: 99%   Weight: 52.2 kg (115 lb 1.3 oz)   Height: 5' 7" (1.702 m)       Abnormal Lab Results:  Labs Reviewed - No data to display     All Lab Results:  None    Imaging Results:  Imaging Results    None                 The Emergency Provider reviewed the vital signs and test results, which are outlined above.    ED Discussion     9:38 AM: Pt is awake, alert, and in no distress. Discussed pt plan of tx. Gave pt all f/u and return to the ED instructions. All questions and concerns were addressed at this time. Pt expresses understanding of information and instructions, and is comfortable with plan " to discharge. Pt is stable for discharge.    I discussed with patient and/or family/caretaker that evaluation in the ED does not suggest any emergent or life threatening medical conditions requiring immediate intervention beyond what was provided in the ED, and I believe patient is safe for discharge.  Regardless, an unremarkable evaluation in the ED does not preclude the development or presence of a serious of life threatening condition. As such, patient was instructed to return immediately for any worsening or change in current symptoms.    I discussed wound care precautions with patient and/or family/caretaker; specifically that all wounds have risk of infection despite efforts to cleanse and debride the wound; and there is a risk of an occult foreign body (and thus increased risk of infection) despite a negative examination.  I discussed with patient need to return for any signs of infection, specifically redness, increased pain, fever, drainage of pus, or any concern, immediately.        ED Medication(s):  Medications   lidocaine (PF) 10 mg/ml (1%) injection 50 mg (50 mg Infiltration Given 5/30/18 0926)       Discharge Medication List as of 5/30/2018  9:39 AM      START taking these medications    Details   naproxen (NAPROSYN) 375 MG tablet Take 1 tablet (375 mg total) by mouth 2 (two) times daily with meals., Starting Wed 5/30/2018, Print      sulfamethoxazole-trimethoprim 800-160mg (BACTRIM DS) 800-160 mg Tab Take 2 tablets by mouth 2 (two) times daily., Starting Wed 5/30/2018, Until Wed 6/6/2018, Print             Follow-up Information     [facility] In 2 days.    Specialty:  Internal Medicine  Contact information:  [address]  [facility] [address] [phone]  Additional information:  (off [facility]) 1st floor                   Medical Decision Making              Scribe Attestation:   Scribe #1: I performed the above scribed service and the documentation accurately  describes the services I performed. I attest to the accuracy of the note.    Attending:   Physician Attestation Statement for Scribe #1: I, Fran Winston MD, personally performed the services described in this documentation, as scribed by Corinne Mack, in my presence, and it is both accurate and complete.          Clinical Impression       ICD-10-CM ICD-9-CM   1. Abscess of left thigh L02.416 682.6       Disposition:   Disposition: Discharged  Condition: Stable           Fran Winston MD  05/30/18 1035

## 2018-07-09 ENCOUNTER — HOSPITAL ENCOUNTER (EMERGENCY)
Facility: HOSPITAL | Age: 28
Discharge: HOME | End: 2018-07-10
Attending: EMERGENCY MEDICINE

## 2018-07-09 VITALS
BODY MASS INDEX: 18.92 KG/M2 | TEMPERATURE: 99 F | WEIGHT: 113.56 LBS | RESPIRATION RATE: 18 BRPM | HEIGHT: 65 IN | OXYGEN SATURATION: 100 % | HEART RATE: 72 BPM | DIASTOLIC BLOOD PRESSURE: 76 MMHG | SYSTOLIC BLOOD PRESSURE: 128 MMHG

## 2018-07-09 DIAGNOSIS — N83.201 CYSTS OF BOTH OVARIES: ICD-10-CM

## 2018-07-09 DIAGNOSIS — R10.31 ABDOMINAL PAIN, ACUTE, BILATERAL LOWER QUADRANT: Primary | ICD-10-CM

## 2018-07-09 DIAGNOSIS — N83.202 CYSTS OF BOTH OVARIES: ICD-10-CM

## 2018-07-09 DIAGNOSIS — N93.8 DYSFUNCTIONAL UTERINE BLEEDING: ICD-10-CM

## 2018-07-09 DIAGNOSIS — R10.32 ABDOMINAL PAIN, ACUTE, BILATERAL LOWER QUADRANT: Primary | ICD-10-CM

## 2018-07-09 LAB
ABO + RH BLD: NORMAL
ALBUMIN SERPL BCP-MCNC: 4.2 G/DL
ALP SERPL-CCNC: 62 U/L
ALT SERPL W/O P-5'-P-CCNC: 20 U/L
ANION GAP SERPL CALC-SCNC: 10 MMOL/L
AST SERPL-CCNC: 25 U/L
B-HCG UR QL: NEGATIVE
BACTERIA #/AREA URNS HPF: NORMAL /HPF
BASOPHILS # BLD AUTO: 0.03 K/UL
BASOPHILS NFR BLD: 0.3 %
BILIRUB SERPL-MCNC: 0.4 MG/DL
BILIRUB UR QL STRIP: NEGATIVE
BLD GP AB SCN CELLS X3 SERPL QL: NORMAL
BUN SERPL-MCNC: 14 MG/DL
CALCIUM SERPL-MCNC: 10.1 MG/DL
CHLORIDE SERPL-SCNC: 108 MMOL/L
CLARITY UR: CLEAR
CO2 SERPL-SCNC: 24 MMOL/L
COLOR UR: YELLOW
CREAT SERPL-MCNC: 1.1 MG/DL
DIFFERENTIAL METHOD: NORMAL
EOSINOPHIL # BLD AUTO: 0.2 K/UL
EOSINOPHIL NFR BLD: 1.5 %
ERYTHROCYTE [DISTWIDTH] IN BLOOD BY AUTOMATED COUNT: 13.3 %
EST. GFR  (AFRICAN AMERICAN): >60 ML/MIN/1.73 M^2
EST. GFR  (NON AFRICAN AMERICAN): >60 ML/MIN/1.73 M^2
GLUCOSE SERPL-MCNC: 93 MG/DL
GLUCOSE UR QL STRIP: NEGATIVE
HCT VFR BLD AUTO: 38.3 %
HGB BLD-MCNC: 12.7 G/DL
HGB UR QL STRIP: ABNORMAL
KETONES UR QL STRIP: ABNORMAL
LEUKOCYTE ESTERASE UR QL STRIP: NEGATIVE
LYMPHOCYTES # BLD AUTO: 4.6 K/UL
LYMPHOCYTES NFR BLD: 39.1 %
MCH RBC QN AUTO: 28.7 PG
MCHC RBC AUTO-ENTMCNC: 33.2 G/DL
MCV RBC AUTO: 87 FL
MICROSCOPIC COMMENT: NORMAL
MONOCYTES # BLD AUTO: 0.9 K/UL
MONOCYTES NFR BLD: 7.9 %
NEUTROPHILS # BLD AUTO: 6.1 K/UL
NEUTROPHILS NFR BLD: 51.2 %
NITRITE UR QL STRIP: NEGATIVE
PH UR STRIP: 6 [PH] (ref 5–8)
PLATELET # BLD AUTO: 268 K/UL
PMV BLD AUTO: 9.9 FL
POTASSIUM SERPL-SCNC: 4.2 MMOL/L
PROT SERPL-MCNC: 8.1 G/DL
PROT UR QL STRIP: NEGATIVE
RBC # BLD AUTO: 4.42 M/UL
RBC #/AREA URNS HPF: 0 /HPF (ref 0–4)
SODIUM SERPL-SCNC: 142 MMOL/L
SP GR UR STRIP: >=1.03 (ref 1–1.03)
SQUAMOUS #/AREA URNS HPF: 2 /HPF
URN SPEC COLLECT METH UR: ABNORMAL
UROBILINOGEN UR STRIP-ACNC: NEGATIVE EU/DL
WBC # BLD AUTO: 11.82 K/UL
WBC #/AREA URNS HPF: 1 /HPF (ref 0–5)

## 2018-07-09 PROCEDURE — 81000 URINALYSIS NONAUTO W/SCOPE: CPT

## 2018-07-09 PROCEDURE — 80053 COMPREHEN METABOLIC PANEL: CPT

## 2018-07-09 PROCEDURE — 85025 COMPLETE CBC W/AUTO DIFF WBC: CPT

## 2018-07-09 PROCEDURE — 96361 HYDRATE IV INFUSION ADD-ON: CPT

## 2018-07-09 PROCEDURE — 86901 BLOOD TYPING SEROLOGIC RH(D): CPT

## 2018-07-09 PROCEDURE — 96360 HYDRATION IV INFUSION INIT: CPT

## 2018-07-09 PROCEDURE — 25000003 PHARM REV CODE 250: Performed by: NURSE PRACTITIONER

## 2018-07-09 PROCEDURE — 99284 EMERGENCY DEPT VISIT MOD MDM: CPT | Mod: 25

## 2018-07-09 PROCEDURE — 81025 URINE PREGNANCY TEST: CPT

## 2018-07-09 RX ORDER — IBUPROFEN 600 MG/1
600 TABLET ORAL EVERY 6 HOURS PRN
Qty: 20 TABLET | Refills: 0 | Status: SHIPPED | OUTPATIENT
Start: 2018-07-09

## 2018-07-09 RX ADMIN — SODIUM CHLORIDE 1000 ML: 0.9 INJECTION, SOLUTION INTRAVENOUS at 09:07

## 2018-07-10 PROCEDURE — 25000003 PHARM REV CODE 250: Performed by: NURSE PRACTITIONER

## 2018-07-10 RX ORDER — HYDROCODONE BITARTRATE AND ACETAMINOPHEN 10; 325 MG/1; MG/1
1 TABLET ORAL
Status: COMPLETED | OUTPATIENT
Start: 2018-07-10 | End: 2018-07-10

## 2018-07-10 RX ADMIN — HYDROCODONE BITARTRATE AND ACETAMINOPHEN 1 TABLET: 10; 325 TABLET ORAL at 12:07

## 2018-07-10 NOTE — ED PROVIDER NOTES
SCRIBE #1 NOTE: I, Danay Mariscal, am scribing for, and in the presence of, Jordi Be NP. I have scribed the HPI, ROS, and PEx of the note.     SCRIBE #2 NOTE: I, Christina Llamas, am scribing for, and in the presence of,  Jordi Be NP. I have scribed the remaining portions of the note not scribed by Scribe #1.     History      Chief Complaint   Patient presents with    Vaginal Bleeding     x 3 months. hx ovarian cysts. RLQ abd discomfort       Review of patient's allergies indicates:  No Known Allergies     HPI   HPI    7/9/2018, 9:21 PM   History obtained from the patient      History of Present Illness: Flavia Patricio is a 27 y.o. female patient who presents to the Emergency Department for vaginal bleeding which onset 3 months ago. Symptoms are constant and moderate in severity. Pt reports having a hx of ovarian cysts and endometriosis. Pt reports seeing her OB and reports trying estrogen and Depo with no relief of sxs. No mitigating or exacerbating factors reported. Associated sxs include RLQ abdominal pain. Patient denies any CP, SOB, vaginal discharge, n/v/d, and all other sxs at this time. No prior Tx. No further complaints or concerns at this time.         Arrival mode: Personal vehicle     PCP: Primary Doctor No       Past Medical History:  Past Medical History:   Diagnosis Date    Anemia     Endometriosis     Genital herpes complicating pregnancy in second trimester 2/16/2015    GERD (gastroesophageal reflux disease)     Insomnia     Miscarriage     Ovarian cyst     Pancreatitis, acute     Pelvic inflammatory disease (PID)     Restless leg syndrome        Past Surgical History:  Past Surgical History:   Procedure Laterality Date    COLONOSCOPY W/ POLYPECTOMY      x 1    PELVIC LAPAROSCOPY  05/01/2017    WISDOM TOOTH EXTRACTION           Family History:  Family History   Problem Relation Age of Onset    Colon cancer Maternal Grandfather     Heart attack Maternal Grandfather      "Breast cancer Cousin     Heart attack Maternal Uncle     Heart attack Maternal Uncle     Heart attack Maternal Uncle     Heart attack Maternal Uncle     Heart attack Maternal Uncle     Heart attack Paternal Grandfather     Heart attack Paternal Grandmother        Social History:  Social History     Social History Main Topics    Smoking status: Light Tobacco Smoker    Smokeless tobacco: Never Used      Comment: pt states "i smoke marijuana twice a day"    Alcohol use Yes      Comment: rare    Drug use: Yes     Frequency: 14.0 times per week     Types: Marijuana    Sexual activity: Yes     Partners: Male     Birth control/ protection: None       ROS   Review of Systems   Constitutional: Negative for chills and fever.   HENT: Negative for sore throat.    Respiratory: Negative for shortness of breath.    Cardiovascular: Negative for chest pain.   Gastrointestinal: Positive for abdominal pain. Negative for diarrhea, nausea and vomiting.   Genitourinary: Positive for vaginal bleeding. Negative for dysuria.   Musculoskeletal: Negative for back pain.   Skin: Negative for rash.   Neurological: Negative for weakness.   Hematological: Does not bruise/bleed easily.   All other systems reviewed and are negative.      Physical Exam      Initial Vitals [07/09/18 1927]   BP Pulse Resp Temp SpO2   129/83 (!) 117 18 98.8 °F (37.1 °C) 100 %      MAP       --          Physical Exam  Nursing Notes and Vital Signs Reviewed.  Constitutional: Patient is in no acute distress. Well-developed and well-nourished.  Head: Atraumatic. Normocephalic.  Eyes: PERRL. EOM intact. Conjunctivae are not pale. No scleral icterus.  ENT: Mucous membranes are moist. Oropharynx is clear and symmetric.    Neck: Supple. Full ROM. No lymphadenopathy.  Cardiovascular: Regular rate. Regular rhythm. No murmurs, rubs, or gallops. Distal pulses are 2+ and symmetric.  Pulmonary/Chest: No respiratory distress. Clear to auscultation bilaterally. No wheezing " "or rales.  Abdominal: Soft and non-distended.  There is suprapubic tenderness.  No rebound, guarding, or rigidity. Good bowel sounds.  Genitourinary: No CVA tenderness  Musculoskeletal: Moves all extremities. No obvious deformities. No edema. No calf tenderness.  Skin: Warm and dry.  Neurological:  Alert, awake, and appropriate.  Normal speech.  No acute focal neurological deficits are appreciated.  Psychiatric: Normal affect. Good eye contact. Appropriate in content.    ED Course    Procedures  ED Vital Signs:  Vitals:    07/09/18 1927 07/09/18 2358   BP: 129/83 128/76   Pulse: (!) 117 72   Resp: 18 18   Temp: 98.8 °F (37.1 °C)    TempSrc: Oral    SpO2: 100% 100%   Weight: 51.5 kg (113 lb 8.6 oz)    Height: 5' 5" (1.651 m)        Abnormal Lab Results:  Labs Reviewed   URINALYSIS, REFLEX TO URINE CULTURE - Abnormal; Notable for the following:        Result Value    Specific Gravity, UA >=1.030 (*)     Ketones, UA Trace (*)     Occult Blood UA 3+ (*)     All other components within normal limits   CBC W/ AUTO DIFFERENTIAL   COMPREHENSIVE METABOLIC PANEL   PREGNANCY TEST, URINE RAPID   URINALYSIS, REFLEX TO URINE CULTURE   URINALYSIS MICROSCOPIC   TYPE & SCREEN        All Lab Results:  Results for orders placed or performed during the hospital encounter of 07/09/18   CBC W/ AUTO DIFFERENTIAL   Result Value Ref Range    WBC 11.82 3.90 - 12.70 K/uL    RBC 4.42 4.00 - 5.40 M/uL    Hemoglobin 12.7 12.0 - 16.0 g/dL    Hematocrit 38.3 37.0 - 48.5 %    MCV 87 82 - 98 fL    MCH 28.7 27.0 - 31.0 pg    MCHC 33.2 32.0 - 36.0 g/dL    RDW 13.3 11.5 - 14.5 %    Platelets 268 150 - 350 K/uL    MPV 9.9 9.2 - 12.9 fL    Gran # (ANC) 6.1 1.8 - 7.7 K/uL    Lymph # 4.6 1.0 - 4.8 K/uL    Mono # 0.9 0.3 - 1.0 K/uL    Eos # 0.2 0.0 - 0.5 K/uL    Baso # 0.03 0.00 - 0.20 K/uL    Gran% 51.2 38.0 - 73.0 %    Lymph% 39.1 18.0 - 48.0 %    Mono% 7.9 4.0 - 15.0 %    Eosinophil% 1.5 0.0 - 8.0 %    Basophil% 0.3 0.0 - 1.9 %    Differential Method " Automated    Comp. Metabolic Panel   Result Value Ref Range    Sodium 142 136 - 145 mmol/L    Potassium 4.2 3.5 - 5.1 mmol/L    Chloride 108 95 - 110 mmol/L    CO2 24 23 - 29 mmol/L    Glucose 93 70 - 110 mg/dL    BUN, Bld 14 6 - 20 mg/dL    Creatinine 1.1 0.5 - 1.4 mg/dL    Calcium 10.1 8.7 - 10.5 mg/dL    Total Protein 8.1 6.0 - 8.4 g/dL    Albumin 4.2 3.5 - 5.2 g/dL    Total Bilirubin 0.4 0.1 - 1.0 mg/dL    Alkaline Phosphatase 62 55 - 135 U/L    AST 25 10 - 40 U/L    ALT 20 10 - 44 U/L    Anion Gap 10 8 - 16 mmol/L    eGFR if African American >60 >60 mL/min/1.73 m^2    eGFR if non African American >60 >60 mL/min/1.73 m^2   Pregnancy, urine rapid   Result Value Ref Range    Preg Test, Ur Negative    Urinalysis, Reflex to Urine Culture   Result Value Ref Range    Specimen UA Urine, Clean Catch     Color, UA Yellow Yellow, Straw, Daiana    Appearance, UA Clear Clear    pH, UA 6.0 5.0 - 8.0    Specific Gravity, UA >=1.030 (A) 1.005 - 1.030    Protein, UA Negative Negative    Glucose, UA Negative Negative    Ketones, UA Trace (A) Negative    Bilirubin (UA) Negative Negative    Occult Blood UA 3+ (A) Negative    Nitrite, UA Negative Negative    Urobilinogen, UA Negative <2.0 EU/dL    Leukocytes, UA Negative Negative   Urinalysis Microscopic   Result Value Ref Range    RBC, UA 0 0 - 4 /hpf    WBC, UA 1 0 - 5 /hpf    Bacteria, UA Rare None-Occ /hpf    Squam Epithel, UA 2 /hpf    Microscopic Comment SEE COMMENT    Type & Screen   Result Value Ref Range    Group & Rh O POS     Indirect Jose Antonio NEG          Imaging Results:  Imaging Results          US Pelvis Comp with Transvag NON-OB (xpd) (Final result)  Result time 07/09/18 23:44:32   Procedure changed from US Pelvis Complete Non OB     Final result by Dagoberto Hamilton Jr., MD (07/09/18 23:44:32)                 Impression:      Follicular cysts bilaterally.  No acute findings.      Electronically signed by: Dagoberto Hamilton Jr., MD  Date:    07/09/2018  Time:    23:44              Narrative:    EXAMINATION:  US PELVIS COMP WITH TRANSVAG NON-OB (XPD)    CLINICAL HISTORY:  pelvic pain;    TECHNIQUE:  Transabdominal sonography of the pelvis was performed, followed by transvaginal sonography to better evaluate the uterus and ovaries.    COMPARISON:  03/23/2017.    FINDINGS:  Uterus:    Size: 6.9 x 3.0 x 4.2 cm    Masses: None    Endometrium: Normal in this pre menopausal patient, measuring 6 mm.    Right ovary:    Size: 2.6 by 2.5 x 3 cm    Appearance: Follicular cysts largest measuring 1 cm    Vascular flow: Normal.    Left ovary:    Size: 3.3 x 2.3 x 2.9 cm    Appearance: Multiple follicular cysts.    Vascular Flow: Normal.    Free Fluid:    None.                                        The Emergency Provider reviewed the vital signs and test results, which are outlined above.    ED Discussion   Reevaluation: The patient feels better and is resting    comfortably. Pt states symptoms are improved. Discussed test results, shared treatment plan, specific conditions for return, and the importance of follow up. Pt feels comfortable with the plan as discussed. Answered questions at this time. Patient has remained hemodynamically stable throughout ED course and is stable for discharge.     ED Medication(s):  Medications   sodium chloride 0.9% bolus 1,000 mL (0 mLs Intravenous Stopped 7/10/18 0002)   HYDROcodone-acetaminophen  mg per tablet 1 tablet (1 tablet Oral Given 7/10/18 0011)       Discharge Medication List as of 7/9/2018 11:59 PM      START taking these medications    Details   ibuprofen (ADVIL,MOTRIN) 600 MG tablet Take 1 tablet (600 mg total) by mouth every 6 (six) hours as needed., Starting Mon 7/9/2018, Print             Follow-up Information     [facility]    Specialty:  Emergency Medicine  Why:  As needed, If symptoms worsen  Contact information:  [address]  [facility] [address] [phone]           Schedule an appointment as  soon as possible for a visit  with OBGYN.                   Medical Decision Making              Scribe Attestation:   Scribe #1: I performed the above scribed service and the documentation accurately describes the services I performed. I attest to the accuracy of the note.    Attending:   Physician Attestation Statement for Scribe #1: I, Jordi Be NP, personally performed the services described in this documentation, as scribed by Danay Mariscal, in my presence, and it is both accurate and complete.       Scribe Attestation:   Scribe #2: I performed the above scribed service and the documentation accurately describes the services I performed. I attest to the accuracy of the note.    Attending Attestation:           Physician Attestation for Scribe:    Physician Attestation Statement for Scribe #2: I, Jordi Be NP, reviewed documentation, as scribed by Christina Llamas in my presence, and it is both accurate and complete. I also acknowledge and confirm the content of the note done by Scribe #1.          Clinical Impression       ICD-10-CM ICD-9-CM   1. Abdominal pain, acute, bilateral lower quadrant R10.31 789.03    R10.32 789.04     338.19   2. Cysts of both ovaries N83.201 620.2    N83.202    3. Dysfunctional uterine bleeding N93.8 626.8               Jordi Be NP  07/15/18 7065

## 2018-08-10 ENCOUNTER — HOSPITAL ENCOUNTER (EMERGENCY)
Facility: HOSPITAL | Age: 28
Discharge: HOME | End: 2018-08-10
Attending: EMERGENCY MEDICINE

## 2018-08-10 VITALS
DIASTOLIC BLOOD PRESSURE: 61 MMHG | HEART RATE: 80 BPM | BODY MASS INDEX: 19.77 KG/M2 | RESPIRATION RATE: 20 BRPM | WEIGHT: 123 LBS | HEIGHT: 66 IN | OXYGEN SATURATION: 100 % | TEMPERATURE: 98 F | SYSTOLIC BLOOD PRESSURE: 106 MMHG

## 2018-08-10 DIAGNOSIS — M25.552 ACUTE HIP PAIN, LEFT: ICD-10-CM

## 2018-08-10 DIAGNOSIS — S76.012A HIP STRAIN, LEFT, INITIAL ENCOUNTER: Primary | ICD-10-CM

## 2018-08-10 PROCEDURE — 99284 EMERGENCY DEPT VISIT MOD MDM: CPT | Mod: 25

## 2018-08-10 PROCEDURE — 25000003 PHARM REV CODE 250: Performed by: EMERGENCY MEDICINE

## 2018-08-10 RX ORDER — HYDROCODONE BITARTRATE AND ACETAMINOPHEN 10; 325 MG/1; MG/1
1 TABLET ORAL
Status: COMPLETED | OUTPATIENT
Start: 2018-08-10 | End: 2018-08-10

## 2018-08-10 RX ORDER — NAPROXEN 500 MG/1
500 TABLET ORAL
Status: COMPLETED | OUTPATIENT
Start: 2018-08-10 | End: 2018-08-10

## 2018-08-10 RX ORDER — CYCLOBENZAPRINE HCL 10 MG
10 TABLET ORAL 3 TIMES DAILY PRN
Qty: 9 TABLET | Refills: 0 | Status: SHIPPED | OUTPATIENT
Start: 2018-08-10 | End: 2018-08-15

## 2018-08-10 RX ORDER — HYDROCODONE BITARTRATE AND ACETAMINOPHEN 7.5; 325 MG/1; MG/1
1 TABLET ORAL EVERY 6 HOURS PRN
Qty: 12 TABLET | Refills: 0 | Status: SHIPPED | OUTPATIENT
Start: 2018-08-10

## 2018-08-10 RX ORDER — NAPROXEN 500 MG/1
500 TABLET ORAL 2 TIMES DAILY WITH MEALS
Qty: 20 TABLET | Refills: 0 | Status: SHIPPED | OUTPATIENT
Start: 2018-08-10

## 2018-08-10 RX ORDER — ONDANSETRON 4 MG/1
4 TABLET, ORALLY DISINTEGRATING ORAL
Status: COMPLETED | OUTPATIENT
Start: 2018-08-10 | End: 2018-08-10

## 2018-08-10 RX ADMIN — HYDROCODONE BITARTRATE AND ACETAMINOPHEN 1 TABLET: 10; 325 TABLET ORAL at 08:08

## 2018-08-10 RX ADMIN — NAPROXEN 500 MG: 500 TABLET ORAL at 08:08

## 2018-08-10 RX ADMIN — ONDANSETRON 4 MG: 4 TABLET, ORALLY DISINTEGRATING ORAL at 08:08

## 2018-08-10 NOTE — ED PROVIDER NOTES
SCRIBE #1 NOTE: I, Corinne Mack, am scribing for, and in the presence of, Aravind De Luna MD. I have scribed the entire note.      History      Chief Complaint   Patient presents with    Hip Pain     left hip pain that radaites down left leg x one day       Review of patient's allergies indicates:  No Known Allergies     HPI   HPI    8/10/2018, 6:20 AM   History obtained from the patient      History of Present Illness: Flavia Patricio is a 28 y.o. female patient with PMHx of RLS who presents to the Emergency Department for L hip pain that radiates to the L foot which onset yesterday. Symptoms are constant and moderate in severity. Walking, weightbearing and palpation worsens the pt's hip pain. No mitigating factors reported. No associated sxs reported. Patient denies any N/V, back pain, neck pain, injury, HA, dizziness, and all other sxs at this time. Prior Tx includes tylenol, ibuprofen, naproxen, and muscle relaxers with no improvement. No further complaints or concerns at this time.         Arrival mode: Personal vehicle    PCP: Primary Doctor No       Past Medical History:  Past Medical History:   Diagnosis Date    Anemia     Endometriosis     Genital herpes complicating pregnancy in second trimester 2/16/2015    GERD (gastroesophageal reflux disease)     Insomnia     Miscarriage     Ovarian cyst     Pancreatitis, acute     Pelvic inflammatory disease (PID)     Restless leg syndrome        Past Surgical History:  Past Surgical History:   Procedure Laterality Date    COLONOSCOPY W/ POLYPECTOMY      x 1    PELVIC LAPAROSCOPY  05/01/2017    WISDOM TOOTH EXTRACTION           Family History:  Family History   Problem Relation Age of Onset    Colon cancer Maternal Grandfather     Heart attack Maternal Grandfather     Breast cancer Cousin     Heart attack Maternal Uncle     Heart attack Maternal Uncle     Heart attack Maternal Uncle     Heart attack Maternal Uncle     Heart attack Maternal  "Uncle     Heart attack Paternal Grandfather     Heart attack Paternal Grandmother     Hypertension Mother     Cancer Father     Hypertension Father        Social History:  Social History     Social History Main Topics    Smoking status: Light Tobacco Smoker    Smokeless tobacco: Never Used      Comment: pt states "i smoke marijuana twice a day"    Alcohol use Yes      Comment: rare    Drug use: Yes     Frequency: 14.0 times per week     Types: Marijuana    Sexual activity: Yes     Partners: Male     Birth control/ protection: None       ROS   Review of Systems   Constitutional: Negative for chills and fever.   Respiratory: Negative for cough and shortness of breath.    Cardiovascular: Negative for chest pain and leg swelling.   Gastrointestinal: Negative for abdominal pain, diarrhea, nausea and vomiting.   Musculoskeletal: Positive for arthralgias (L hip; radiating). Negative for back pain, neck pain and neck stiffness.        (-) injury   Skin: Negative for rash and wound.   Neurological: Negative for dizziness, light-headedness, numbness and headaches.   All other systems reviewed and are negative.    Physical Exam      Initial Vitals [08/10/18 0556]   BP Pulse Resp Temp SpO2   95/63 88 18 97.9 °F (36.6 °C) 98 %      MAP       --          Physical Exam  Nursing Notes and Vital Signs Reviewed.  Constitutional: Patient is in no apparent distress. Well-developed and well-nourished.  Head: Atraumatic. Normocephalic.  Eyes: PERRL. EOM intact. Conjunctivae are not pale. No scleral icterus.  ENT: Mucous membranes are moist. Oropharynx is clear and symmetric.    Neck: Supple. Full ROM. No lymphadenopathy.  Cardiovascular: Regular rate. Regular rhythm. No murmurs, rubs, or gallops. Distal pulses are 2+ and symmetric.  Pulmonary/Chest: No respiratory distress. Clear to auscultation bilaterally. No wheezing or rales.  Abdominal: Soft and non-distended.  There is no tenderness.  No rebound, guarding, or rigidity. " "  Musculoskeletal: Moves all extremities. No obvious deformities. Tenderness to palpation over the lateral L hip. Pain over the L hip with all ROM. No evidence of a septic joint.  Skin: Warm and dry.  Neurological:  Alert, awake, and appropriate.  Normal speech.  No acute focal neurological deficits are appreciated.  Psychiatric: Normal affect. Good eye contact. Appropriate in content.    ED Course    Procedures  ED Vital Signs:  Vitals:    08/10/18 0556 08/10/18 0818   BP: 95/63 106/61   Pulse: 88 80   Resp: 18 20   Temp: 97.9 °F (36.6 °C)    TempSrc: Oral    SpO2: 98% 100%   Weight: 55.8 kg (123 lb)    Height: 5' 6" (1.676 m)        Abnormal Lab Results:  Labs Reviewed - No data to display     All Lab Results:  None    Imaging Results:  Imaging Results          X-Ray Hip 2 View Left (Final result)  Result time 08/10/18 07:30:15    Final result by JEANNA Posada Sr., MD (08/10/18 07:30:15)                 Impression:      Normal study.      Electronically signed by: Isael Posada MD  Date:    08/10/2018  Time:    07:30             Narrative:    EXAMINATION:  XR HIP 2 VIEW LEFT    CLINICAL HISTORY:  Pain in left hip    COMPARISON:  None    FINDINGS:  There is no fracture. There is no dislocation.                                        The Emergency Provider reviewed the vital signs and test results, which are outlined above.    ED Discussion     6:35 AM: Pt declines the UPT at this time. Pt insists she is not pregnant and that there is not chance she is pregnant.    7:51 AM: Reassessed pt at this time. Pt is awake, alert, and in no distress. Pt's mother is at the bedside and will drive the pt home. Discussed with pt all pertinent ED information and results. Discussed pt dx and plan of tx. Explained to pt that there is no evidence of a septic joint, but advised the pt on the signs and sxs of a septic joint. Pt requests nausea medication to go with her pain medication. Gave pt all f/u and return to the ED " instructions. All questions and concerns were addressed at this time. Pt expresses understanding of information and instructions, and is comfortable with plan to discharge. Pt is stable for discharge.    I discussed with patient and/or family/caretaker that evaluation in the ED does not suggest any emergent or life threatening medical conditions requiring immediate intervention beyond what was provided in the ED, and I believe patient is safe for discharge.  Regardless, an unremarkable evaluation in the ED does not preclude the development or presence of a serious of life threatening condition. As such, patient was instructed to return immediately for any worsening or change in current symptoms.    I discussed with patient and/or family/caretaker that negative X-ray does not rule out occult fracture or other soft tissue injury.  Persistent pain greater than 7-10 days or increased pain requires follow up, specifically with orthopedics.       ED Medication(s):  Medications   HYDROcodone-acetaminophen  mg per tablet 1 tablet (1 tablet Oral Given 8/10/18 0814)   ondansetron disintegrating tablet 4 mg (4 mg Oral Given 8/10/18 0814)   naproxen tablet 500 mg (500 mg Oral Given 8/10/18 0814)       Discharge Medication List as of 8/10/2018  7:55 AM      START taking these medications    Details   cyclobenzaprine (FLEXERIL) 10 MG tablet Take 1 tablet (10 mg total) by mouth 3 (three) times daily as needed for Muscle spasms., Starting Fri 8/10/2018, Until Wed 8/15/2018, Normal      HYDROcodone-acetaminophen (NORCO) 7.5-325 mg per tablet Take 1 tablet by mouth every 6 (six) hours as needed., Starting Fri 8/10/2018, Print      naproxen (NAPROSYN) 500 MG tablet Take 1 tablet (500 mg total) by mouth 2 (two) times daily with meals., Starting Fri 8/10/2018, Normal             Follow-up Information     Primary Doctor No. Schedule an appointment as soon as possible for a visit in 3 days.    Why:  Follow-up with her primary care  doctor the next 2-3 days for recheck and referral to orthopedic doctor for further evaluation if indicated.  Return to the emergency department for any worsening signs or symptoms.           Schedule an appointment as soon as possible for a visit  with Dilip Lanier MD.    Specialty:  Orthopedic Surgery  Why:  As needed  Contact information:  [address]  [place] [address]  [phone]                     Medical Decision Making    Medical Decision Making:   Clinical Tests:   Radiological Study: Reviewed and Ordered           Scribe Attestation:   Scribe #1: I performed the above scribed service and the documentation accurately describes the services I performed. I attest to the accuracy of the note.    Attending:   Physician Attestation Statement for Scribe #1: I, Aravind De Luna MD, personally performed the services described in this documentation, as scribed by Corinne Mack, in my presence, and it is both accurate and complete.          Clinical Impression       ICD-10-CM ICD-9-CM   1. Hip strain, left, initial encounter S76.012A 843.9   2. Acute hip pain, left M25.552 719.45       Disposition:   Disposition: Discharged  Condition: Stable           Aravind De Luna MD  08/11/18 0896
